# Patient Record
Sex: MALE | Race: WHITE | NOT HISPANIC OR LATINO | Employment: OTHER | URBAN - METROPOLITAN AREA
[De-identification: names, ages, dates, MRNs, and addresses within clinical notes are randomized per-mention and may not be internally consistent; named-entity substitution may affect disease eponyms.]

---

## 2016-08-12 LAB — HBA1C MFR BLD HPLC: 9.2 %

## 2017-03-03 LAB — HBA1C MFR BLD HPLC: 9.1 %

## 2017-10-26 LAB
CREAT ?TM UR-SCNC: 100.7 UMOL/L
HBA1C MFR BLD HPLC: 9.2 %
MICROALBUMIN/CREAT UR: NORMAL MG/G{CREAT}

## 2018-03-23 DIAGNOSIS — E11.8 TYPE 2 DIABETES MELLITUS WITH COMPLICATION, WITHOUT LONG-TERM CURRENT USE OF INSULIN (HCC): Primary | ICD-10-CM

## 2018-03-26 RX ORDER — GLIPIZIDE 5 MG/1
TABLET ORAL
Qty: 360 TABLET | Refills: 1 | Status: SHIPPED | OUTPATIENT
Start: 2018-03-26 | End: 2018-10-12 | Stop reason: SDUPTHER

## 2018-03-27 LAB — HBA1C MFR BLD HPLC: 9.2 %

## 2018-03-30 ENCOUNTER — OFFICE VISIT (OUTPATIENT)
Dept: ENDOCRINOLOGY | Facility: HOSPITAL | Age: 68
End: 2018-03-30
Payer: COMMERCIAL

## 2018-03-30 VITALS
WEIGHT: 245.8 LBS | HEART RATE: 86 BPM | HEIGHT: 71 IN | SYSTOLIC BLOOD PRESSURE: 160 MMHG | BODY MASS INDEX: 34.41 KG/M2 | DIASTOLIC BLOOD PRESSURE: 90 MMHG

## 2018-03-30 DIAGNOSIS — E11.40 TYPE 2 DIABETES MELLITUS WITH DIABETIC NEUROPATHY, WITHOUT LONG-TERM CURRENT USE OF INSULIN (HCC): ICD-10-CM

## 2018-03-30 DIAGNOSIS — I10 HYPERTENSION, UNSPECIFIED TYPE: ICD-10-CM

## 2018-03-30 DIAGNOSIS — E78.5 HYPERLIPIDEMIA, UNSPECIFIED HYPERLIPIDEMIA TYPE: Primary | ICD-10-CM

## 2018-03-30 PROCEDURE — 99204 OFFICE O/P NEW MOD 45 MIN: CPT | Performed by: INTERNAL MEDICINE

## 2018-03-30 RX ORDER — OMEPRAZOLE 20 MG/1
20 CAPSULE, DELAYED RELEASE ORAL DAILY
COMMUNITY

## 2018-03-30 RX ORDER — LISINOPRIL AND HYDROCHLOROTHIAZIDE 12.5; 1 MG/1; MG/1
1 TABLET ORAL DAILY
COMMUNITY

## 2018-03-30 RX ORDER — ASPIRIN 81 MG/1
81 TABLET, CHEWABLE ORAL DAILY
COMMUNITY

## 2018-03-30 RX ORDER — PNV NO.95/FERROUS FUM/FOLIC AC 28MG-0.8MG
1 TABLET ORAL DAILY
COMMUNITY
End: 2019-03-29 | Stop reason: SINTOL

## 2018-03-30 RX ORDER — MULTIVIT WITH MINERALS/LUTEIN
1000 TABLET ORAL DAILY
COMMUNITY

## 2018-03-30 NOTE — LETTER
March 30, 2018     [address] [place] [address] [facility]    Patient: Aubrie Trinh   YOB: 1950   Date of Visit: 3/30/2018       Dear Dr Astrid Owusu:    Thank you for referring Aubrie Trinh to me for evaluation  Below are my notes for this consultation  If you have questions, please do not hesitate to call me  I look forward to following your patient along with you  Sincerely,        Calos Powell MD        CC: No Recipients  Calos Powell MD  3/30/2018 12:22 PM  Sign at close encounter  3/30/2018    Assessment/Plan      Diagnoses and all orders for this visit:    Hyperlipidemia, unspecified hyperlipidemia type  -     Lipid Panel with Direct LDL reflex Lab Collect; Future    Type 2 diabetes mellitus with diabetic neuropathy, without long-term current use of insulin (HCC)  -     sitaGLIPtin (JANUVIA) 100 mg tablet; Take 1 tablet (100 mg total) by mouth daily  -     Lipid Panel with Direct LDL reflex Lab Collect; Future  -     Comprehensive metabolic panel Lab Collect; Future  -     HEMOGLOBIN A1C W/ EAG ESTIMATION Lab Collect; Future    Hypertension, unspecified type    Other orders  -     Canagliflozin (INVOKANA) 300 MG TABS; Take 300 mg by mouth daily before breakfast    -     metFORMIN (GLUCOPHAGE) 500 mg tablet; Take 500 mg by mouth 2 (two) times a day with meals  -     Loratadine-Pseudoephedrine (CLARITIN-D 12 HOUR PO); Take 1 tablet by mouth daily    -     lisinopril-hydrochlorothiazide (PRINZIDE,ZESTORETIC) 10-12 5 MG per tablet; Take 1 tablet by mouth daily  -     Multiple Vitamin (MULTI-VITAMIN DAILY PO); Take by mouth daily    -     Ascorbic Acid (VITAMIN C) 1000 MG tablet; Take 1,000 mg by mouth daily  -     aspirin (ASPIRIN 81) 81 mg chewable tablet; Chew 81 mg daily  -     omeprazole (PriLOSEC) 20 mg delayed release capsule; Take 20 mg by mouth daily  -     Ferrous Sulfate (IRON) 325 (65 Fe) MG TABS; Take 1 tablet by mouth daily        Assessment/Plan:  1    Type 2 diabetes  His most recent hemoglobin A1c is 9 2% which is way too high  It is not improved since last visit  He was informed his goal of blood sugar control is with a hemoglobin A1c of 7 0%  He is on a med at 3 medications at their maximum dosages, so our only option is to add either another oral agent or insulin  He says due to his lifestyle, insulin would not be an option  I will add Januvia 100 mg daily  He will continue his current dosages of metformin, Invokana, and glipizide  He will continue to check his blood sugars at least once a day  I talked to him about working on Lower carbohydrates in his diet  2   Hyperlipidemia  I will his lipid profile with his next blood work  3   Hypertension  His blood pressure was elevated when he 1st came to the office and decreased back to closer to his normal while on recheck  He will continue his current dose of lisinopril HCTZ  I have asked him to follow up in 3 months with preceding hemoglobin A1c, CMP, and lipid profile  CC: Diabetes Consult    History of Present Illness     HPI: Oren Holder is a 74y o  year old male with type 2 diabetes for 7 years  He was originally diagnosed with type 2 diabetes in 2011, but had difficulty getting his diabetes under control with only metformin  Eventually glipizide was added in 2015  He tried Actos for 2 months but went off it because of the bad press and it  had not helped his blood sugars  [address] was added January of 2016 and change to [facility] October of 2017 due to insurance issues  He is on oral agents at home and takes Invokana 300 mg daily, glipizide 5 mg 3 tablets in the morning and 1 tablet in the evening, metformin 1000 mg twice a day  He denies any polyphasia and blurry vision  He has polyuria with some urinary urgency and polydipsia  He feels his urinary urgency is worse on Invokana that when he was on Farxiga  He has once or twice a night nocturia  He denies chest pain   He has shortness of breath when he climbs steps rapidly  He has numbness and tingling of his feet which are slightly worse but not interfering with his activity  He denies nephropathy, retinopathy, heart attack, stroke and claudication but does admit to neuropathy  Has been eating more heavier foods with office meals recently within the last month or so  Some increase in stress with work  Hypoglycemic episodes: No never  H/o of hypoglycemia causing hospitalization or intervention such as glucagon injection  or ambulance call  No   Hypoglycemia symptoms: unknown since he never had one  Treatment of hypoglycemia:    Glucagon:No   Medic alert tag: recommended,Yes  The patient's last eye exam was in October 2017 with no retinopathy changes  The patient's last foot exam was in not recently seen  Last A1C was 9 2% on March 27, 2018  Blood Sugar/Glucometer/Pump/CGM review: checks blood sugars once a day in the am, no record brought with him today  Before breakfast: 150-190, primarily in the 170s  Before lunch:   Before dinner:   Bedtime:     Review of Systems   Constitutional: Negative for appetite change and fatigue  Weight decreased 4-5 lbs since 10/17   HENT: Positive for tinnitus  Negative for ear pain and hearing loss  Occasional tinnitus   Eyes: Negative for visual disturbance  Respiratory: Positive for shortness of breath  Negative for chest tightness  SOB with rapid climb stairs  Cardiovascular: Negative for chest pain, palpitations and leg swelling  Gastrointestinal: Positive for constipation  Negative for abdominal pain, diarrhea and nausea  Occasional constipation  Endocrine: Positive for polydipsia and polyuria  Negative for polyphagia  Feels more urgent with urination with invokana  Nocturia 1-2 times a night  Musculoskeletal: Positive for arthralgias  Negative for back pain  Some hand pain at dupuytren contracture at times  Skin: Negative for rash  Neurological: Positive for numbness  Negative for dizziness, tremors, light-headedness and headaches  Has numbness and tingling of the feet, slightly worse, does not interfere with activities  Gets pain on plantar surface of feet  Will get foot cramps at night at times  Psychiatric/Behavioral: Negative for sleep disturbance         Historical Information   Past Medical History:   Diagnosis Date    Dupuytren's contracture of hand     bilateral    GERD (gastroesophageal reflux disease)     Migraine     Osteoarthritis     Seasonal allergies      Past Surgical History:   Procedure Laterality Date    CATARACT EXTRACTION, BILATERAL Bilateral     INGUINAL HERNIA REPAIR      VASECTOMY       Social History   History   Alcohol Use No     History   Drug Use No     History   Smoking Status    Never Smoker   Smokeless Tobacco    Never Used     Family History:   Family History   Problem Relation Age of Onset    Hypertension Mother     Dementia Mother     Diabetes type II Mother     Lung cancer Father     Diabetes type II Father     Diabetes type II Sister     Arthritis Sister     Diabetes type II Brother     Diabetes unspecified Sister      prediabetes    Arthritis Sister     Hypertension Son     Obesity Son        Meds/Allergies   Current Outpatient Prescriptions   Medication Sig Dispense Refill    Ascorbic Acid (VITAMIN C) 1000 MG tablet Take 1,000 mg by mouth daily      aspirin (ASPIRIN 81) 81 mg chewable tablet Chew 81 mg daily      Canagliflozin (INVOKANA) 300 MG TABS Take 300 mg by mouth daily before breakfast        Ferrous Sulfate (IRON) 325 (65 Fe) MG TABS Take 1 tablet by mouth daily      glipiZIDE (GLUCOTROL) 5 mg tablet TAKE 3 TABLETS BY MOUTH  EVERY MORNING AND 1 TABLET  BY MOUTH EVERY EVENING 360 tablet 1    lisinopril-hydrochlorothiazide (PRINZIDE,ZESTORETIC) 10-12 5 MG per tablet Take 1 tablet by mouth daily      Loratadine-Pseudoephedrine (CLARITIN-D 12 HOUR PO) Take 1 tablet by mouth daily        metFORMIN (GLUCOPHAGE) 500 mg tablet Take 500 mg by mouth 2 (two) times a day with meals      Multiple Vitamin (MULTI-VITAMIN DAILY PO) Take by mouth daily        omeprazole (PriLOSEC) 20 mg delayed release capsule Take 20 mg by mouth daily      sitaGLIPtin (JANUVIA) 100 mg tablet Take 1 tablet (100 mg total) by mouth daily 90 tablet 3     No current facility-administered medications for this visit  No Known Allergies    Objective   Vitals: Blood pressure 160/90, pulse 86, height 5' 11" (1 803 m), weight 111 kg (245 lb 12 8 oz)  Invasive Devices          No matching active lines, drains, or airways          Physical Exam   Constitutional: He is oriented to person, place, and time  He appears well-developed and well-nourished  HENT:   Head: Normocephalic and atraumatic  Eyes: Conjunctivae and EOM are normal  Pupils are equal, round, and reactive to light  Neck: Normal range of motion  Neck supple  No thyromegaly present  No carotid bruits  Cardiovascular: Normal rate, regular rhythm, normal heart sounds and intact distal pulses  No murmur heard  BP recheck 138/74  Pulmonary/Chest: Effort normal and breath sounds normal  He has no wheezes  Abdominal: Soft  Bowel sounds are normal  There is no tenderness  Musculoskeletal: Normal range of motion  He exhibits no edema or deformity  No ulcerations of the feet  Bone spur on lateral heels bilaterally, right greater than left  No tremor of the outstretched hands, No CVAT  No spinous process tenderness  Lymphadenopathy:     He has no cervical adenopathy  Neurological: He is alert and oriented to person, place, and time  He has normal reflexes  Vibration sensation markedly diminished to the bilateral DIP  Skin: Skin is warm and dry  No rash noted  No erythema  Vitals reviewed  The history was obtained from the review of the chart and from the patient      Lab Results:   Blood work on March 27, 2018 showed hemoglobin A1c of 9 2%  CMP showed a glucose of 176 random  Blood work on October of 2017 showed hemoglobin A1c of 9 2%, TSH 1 28, urine microalbumin to creatinine ratio of less than 1 2 mg/dL  Total cholesterol was 224, triglycerides 191, HDL 50, and   No results found for this or any previous visit (from the past 37964 hour(s))        Future Appointments  Date Time Provider Raleigh Loving   6/28/2018 7:45 AM [facility]ISE Anderson [facility]

## 2018-03-30 NOTE — PROGRESS NOTES
3/30/2018    Assessment/Plan      Diagnoses and all orders for this visit:    Hyperlipidemia, unspecified hyperlipidemia type  -     Lipid Panel with Direct LDL reflex Lab Collect; Future    Type 2 diabetes mellitus with diabetic neuropathy, without long-term current use of insulin (HCC)  -     sitaGLIPtin (JANUVIA) 100 mg tablet; Take 1 tablet (100 mg total) by mouth daily  -     Lipid Panel with Direct LDL reflex Lab Collect; Future  -     Comprehensive metabolic panel Lab Collect; Future  -     HEMOGLOBIN A1C W/ EAG ESTIMATION Lab Collect; Future    Hypertension, unspecified type    Other orders  -     Canagliflozin (INVOKANA) 300 MG TABS; Take 300 mg by mouth daily before breakfast    -     metFORMIN (GLUCOPHAGE) 500 mg tablet; Take 500 mg by mouth 2 (two) times a day with meals  -     Loratadine-Pseudoephedrine (CLARITIN-D 12 HOUR PO); Take 1 tablet by mouth daily    -     lisinopril-hydrochlorothiazide (PRINZIDE,ZESTORETIC) 10-12 5 MG per tablet; Take 1 tablet by mouth daily  -     Multiple Vitamin (MULTI-VITAMIN DAILY PO); Take by mouth daily    -     Ascorbic Acid (VITAMIN C) 1000 MG tablet; Take 1,000 mg by mouth daily  -     aspirin (ASPIRIN 81) 81 mg chewable tablet; Chew 81 mg daily  -     omeprazole (PriLOSEC) 20 mg delayed release capsule; Take 20 mg by mouth daily  -     Ferrous Sulfate (IRON) 325 (65 Fe) MG TABS; Take 1 tablet by mouth daily        Assessment/Plan:  1  Type 2 diabetes  His most recent hemoglobin A1c is 9 2% which is way too high  It is not improved since last visit  He was informed his goal of blood sugar control is with a hemoglobin A1c of 7 0%  He is on a med at 3 medications at their maximum dosages, so our only option is to add either another oral agent or insulin  He says due to his lifestyle, insulin would not be an option  I will add Januvia 100 mg daily  He will continue his current dosages of metformin, Invokana, and glipizide    He will continue to check his blood sugars at least once a day  I talked to him about working on Lower carbohydrates in his diet  2   Hyperlipidemia  I will his lipid profile with his next blood work  3   Hypertension  His blood pressure was elevated when he 1st came to the office and decreased back to closer to his normal while on recheck  He will continue his current dose of lisinopril HCTZ  I have asked him to follow up in 3 months with preceding hemoglobin A1c, CMP, and lipid profile  CC: Diabetes Consult    History of Present Illness     HPI: Carina Hernandez is a 74y o  year old male with type 2 diabetes for 7 years  He was originally diagnosed with type 2 diabetes in 2011, but had difficulty getting his diabetes under control with only metformin  Eventually glipizide was added in 2015  He tried Actos for 2 months but went off it because of the bad press and it  had not helped his blood sugars  [address] was added January of 2016 and change to [facility] October of 2017 due to insurance issues  He is on oral agents at home and takes Invokana 300 mg daily, glipizide 5 mg 3 tablets in the morning and 1 tablet in the evening, metformin 1000 mg twice a day  He denies any polyphasia and blurry vision  He has polyuria with some urinary urgency and polydipsia  He feels his urinary urgency is worse on Invokana that when he was on Farxiga  He has once or twice a night nocturia  He denies chest pain  He has shortness of breath when he climbs steps rapidly  He has numbness and tingling of his feet which are slightly worse but not interfering with his activity  He denies nephropathy, retinopathy, heart attack, stroke and claudication but does admit to neuropathy  Has been eating more heavier foods with office meals recently within the last month or so  Some increase in stress with work  Hypoglycemic episodes: No never    H/o of hypoglycemia causing hospitalization or intervention such as glucagon injection  or ambulance call No   Hypoglycemia symptoms: unknown since he never had one  Treatment of hypoglycemia:    Glucagon:No   Medic alert tag: recommended,Yes  The patient's last eye exam was in October 2017 with no retinopathy changes  The patient's last foot exam was in not recently seen  Last A1C was 9 2% on March 27, 2018  Blood Sugar/Glucometer/Pump/CGM review: checks blood sugars once a day in the am, no record brought with him today  Before breakfast: 150-190, primarily in the 170s  Before lunch:   Before dinner:   Bedtime:     Review of Systems   Constitutional: Negative for appetite change and fatigue  Weight decreased 4-5 lbs since 10/17   HENT: Positive for tinnitus  Negative for ear pain and hearing loss  Occasional tinnitus   Eyes: Negative for visual disturbance  Respiratory: Positive for shortness of breath  Negative for chest tightness  SOB with rapid climb stairs  Cardiovascular: Negative for chest pain, palpitations and leg swelling  Gastrointestinal: Positive for constipation  Negative for abdominal pain, diarrhea and nausea  Occasional constipation  Endocrine: Positive for polydipsia and polyuria  Negative for polyphagia  Feels more urgent with urination with invokana  Nocturia 1-2 times a night  Musculoskeletal: Positive for arthralgias  Negative for back pain  Some hand pain at dupuytren contracture at times  Skin: Negative for rash  Neurological: Positive for numbness  Negative for dizziness, tremors, light-headedness and headaches  Has numbness and tingling of the feet, slightly worse, does not interfere with activities  Gets pain on plantar surface of feet  Will get foot cramps at night at times  Psychiatric/Behavioral: Negative for sleep disturbance         Historical Information   Past Medical History:   Diagnosis Date    Dupuytren's contracture of hand     bilateral    GERD (gastroesophageal reflux disease)     Migraine     Osteoarthritis     Seasonal allergies      Past Surgical History:   Procedure Laterality Date    CATARACT EXTRACTION, BILATERAL Bilateral     INGUINAL HERNIA REPAIR      VASECTOMY       Social History   History   Alcohol Use No     History   Drug Use No     History   Smoking Status    Never Smoker   Smokeless Tobacco    Never Used     Family History:   Family History   Problem Relation Age of Onset    Hypertension Mother     Dementia Mother     Diabetes type II Mother     Lung cancer Father     Diabetes type II Father     Diabetes type II Sister     Arthritis Sister     Diabetes type II Brother     Diabetes unspecified Sister      prediabetes    Arthritis Sister     Hypertension Son     Obesity Son        Meds/Allergies   Current Outpatient Prescriptions   Medication Sig Dispense Refill    Ascorbic Acid (VITAMIN C) 1000 MG tablet Take 1,000 mg by mouth daily      aspirin (ASPIRIN 81) 81 mg chewable tablet Chew 81 mg daily      Canagliflozin (INVOKANA) 300 MG TABS Take 300 mg by mouth daily before breakfast        Ferrous Sulfate (IRON) 325 (65 Fe) MG TABS Take 1 tablet by mouth daily      glipiZIDE (GLUCOTROL) 5 mg tablet TAKE 3 TABLETS BY MOUTH  EVERY MORNING AND 1 TABLET  BY MOUTH EVERY EVENING 360 tablet 1    lisinopril-hydrochlorothiazide (PRINZIDE,ZESTORETIC) 10-12 5 MG per tablet Take 1 tablet by mouth daily      Loratadine-Pseudoephedrine (CLARITIN-D 12 HOUR PO) Take 1 tablet by mouth daily        metFORMIN (GLUCOPHAGE) 500 mg tablet Take 500 mg by mouth 2 (two) times a day with meals      Multiple Vitamin (MULTI-VITAMIN DAILY PO) Take by mouth daily        omeprazole (PriLOSEC) 20 mg delayed release capsule Take 20 mg by mouth daily      sitaGLIPtin (JANUVIA) 100 mg tablet Take 1 tablet (100 mg total) by mouth daily 90 tablet 3     No current facility-administered medications for this visit        No Known Allergies    Objective   Vitals: Blood pressure 160/90, pulse 86, height 5' 11" (1 803 m), weight 111 kg (245 lb 12 8 oz)  Invasive Devices          No matching active lines, drains, or airways          Physical Exam   Constitutional: He is oriented to person, place, and time  He appears well-developed and well-nourished  HENT:   Head: Normocephalic and atraumatic  Eyes: Conjunctivae and EOM are normal  Pupils are equal, round, and reactive to light  Neck: Normal range of motion  Neck supple  No thyromegaly present  No carotid bruits  Cardiovascular: Normal rate, regular rhythm, normal heart sounds and intact distal pulses  No murmur heard  BP recheck 138/74  Pulmonary/Chest: Effort normal and breath sounds normal  He has no wheezes  Abdominal: Soft  Bowel sounds are normal  There is no tenderness  Musculoskeletal: Normal range of motion  He exhibits no edema or deformity  No ulcerations of the feet  Bone spur on lateral heels bilaterally, right greater than left  No tremor of the outstretched hands, No CVAT  No spinous process tenderness  Lymphadenopathy:     He has no cervical adenopathy  Neurological: He is alert and oriented to person, place, and time  He has normal reflexes  Vibration sensation markedly diminished to the bilateral DIP  Skin: Skin is warm and dry  No rash noted  No erythema  Vitals reviewed  The history was obtained from the review of the chart and from the patient  Lab Results:   Blood work on March 27, 2018 showed hemoglobin A1c of 9 2%  CMP showed a glucose of 176 random  Blood work on October of 2017 showed hemoglobin A1c of 9 2%, TSH 1 28, urine microalbumin to creatinine ratio of less than 1 2 mg/dL  Total cholesterol was 224, triglycerides 191, HDL 50, and   No results found for this or any previous visit (from the past 67401 hour(s))        Future Appointments  Date Time Provider Raleigh Loving   6/28/2018 7:45 AM [facility] Bruce [facility]

## 2018-03-30 NOTE — PATIENT INSTRUCTIONS
hba1c is 9 2%  This is way too high, the goal is around 7 0%  You are on the maximum of 3 medicines  Add Januvia 100 mg daily  Continue all other medicines the same  Continue to check blood sugars once daily  Work on better diet as able  Follow up in 3 months with blood work

## 2018-04-18 DIAGNOSIS — E11.8 TYPE 2 DIABETES MELLITUS WITH COMPLICATION, UNSPECIFIED WHETHER LONG TERM INSULIN USE: Primary | ICD-10-CM

## 2018-06-25 DIAGNOSIS — E11.40 TYPE 2 DIABETES MELLITUS WITH DIABETIC NEUROPATHY, WITHOUT LONG-TERM CURRENT USE OF INSULIN (HCC): ICD-10-CM

## 2018-06-26 LAB
ALBUMIN SERPL-MCNC: 4.2 G/DL (ref 3.6–4.8)
ALBUMIN/GLOB SERPL: 1.6 {RATIO} (ref 1.2–2.2)
ALP SERPL-CCNC: 57 IU/L (ref 39–117)
ALT SERPL-CCNC: 22 IU/L (ref 0–44)
AMBIG ABBREV DEFAULT: NORMAL
AST SERPL-CCNC: 15 IU/L (ref 0–40)
BILIRUB SERPL-MCNC: 0.3 MG/DL (ref 0–1.2)
BUN SERPL-MCNC: 18 MG/DL (ref 8–27)
BUN/CREAT SERPL: 18 (ref 10–24)
CALCIUM SERPL-MCNC: 9.4 MG/DL (ref 8.6–10.2)
CHLORIDE SERPL-SCNC: 101 MMOL/L (ref 96–106)
CHOLEST SERPL-MCNC: 207 MG/DL (ref 100–199)
CO2 SERPL-SCNC: 23 MMOL/L (ref 20–29)
CREAT SERPL-MCNC: 1.01 MG/DL (ref 0.76–1.27)
GLOBULIN SER-MCNC: 2.6 G/DL (ref 1.5–4.5)
GLUCOSE SERPL-MCNC: 189 MG/DL (ref 65–99)
HDLC SERPL-MCNC: 54 MG/DL
LDLC SERPL CALC-MCNC: 122 MG/DL (ref 0–99)
LDLC/HDLC SERPL: 2.3 RATIO (ref 0–3.6)
POTASSIUM SERPL-SCNC: 4.3 MMOL/L (ref 3.5–5.2)
PROT SERPL-MCNC: 6.8 G/DL (ref 6–8.5)
SL AMB EGFR AFRICAN AMERICAN: 90 ML/MIN/1.73
SL AMB EGFR NON AFRICAN AMERICAN: 78 ML/MIN/1.73
SL AMB VLDL CHOLESTEROL CALC: 31 MG/DL (ref 5–40)
SODIUM SERPL-SCNC: 141 MMOL/L (ref 134–144)
TRIGL SERPL-MCNC: 155 MG/DL (ref 0–149)

## 2018-06-28 ENCOUNTER — OFFICE VISIT (OUTPATIENT)
Dept: ENDOCRINOLOGY | Facility: HOSPITAL | Age: 68
End: 2018-06-28
Payer: COMMERCIAL

## 2018-06-28 VITALS
BODY MASS INDEX: 33.94 KG/M2 | HEART RATE: 86 BPM | HEIGHT: 71 IN | DIASTOLIC BLOOD PRESSURE: 84 MMHG | WEIGHT: 242.4 LBS | SYSTOLIC BLOOD PRESSURE: 144 MMHG

## 2018-06-28 DIAGNOSIS — I10 HYPERTENSION, UNSPECIFIED TYPE: ICD-10-CM

## 2018-06-28 DIAGNOSIS — E78.5 HYPERLIPIDEMIA, UNSPECIFIED HYPERLIPIDEMIA TYPE: ICD-10-CM

## 2018-06-28 DIAGNOSIS — E11.40 TYPE 2 DIABETES MELLITUS WITH DIABETIC NEUROPATHY, WITHOUT LONG-TERM CURRENT USE OF INSULIN (HCC): Primary | ICD-10-CM

## 2018-06-28 PROCEDURE — 99214 OFFICE O/P EST MOD 30 MIN: CPT | Performed by: NURSE PRACTITIONER

## 2018-06-28 NOTE — PATIENT INSTRUCTIONS
Be mindful of diet  Stay active in stay hydrated  For now, continue current regimen  Please check her blood sugars at least twice daily at alternating times and for the record to the office in 2 weeks for review and adjustment, if necessary  Obtain lab work as prescribed  Continue lisinopril HCTZ  Consider starting statin therapy for your cholesterol, as discussed

## 2018-06-28 NOTE — PROGRESS NOTES
Iam Rg 74 y o  male MRN: [id number]    Encounter: [id number]      Assessment/Plan     Assessment: This is a 74y o -year-old male with type 2 diabetes with hypertension and hyperlipidemia  Plan:  1  Type 2 diabetes:  He is checking his blood sugars once daily in the morning which are typically in the 130 range, by his report  The lab did not run his hemoglobin A1c as part of his lab work  For now, he will continue his current regimen  I have asked him to check his blood sugars twice daily at alternating times and send a record to the office in 2 weeks for review and adjustment, to gain more insight into his glycemic control throughout the day  I talked to him about working on Lower carbohydrates in his diet  Offered medical nutrition therapy for help with his diet which was declined at this time  Check hemoglobin A1c   2   Hyperlipidemia:  His LDL, triglycerides and total cholesterol are slightly elevated  We discussed the benefits, risks and side effects of starting statin therapy  He would like to wait and reassess at future visits  3   Hypertension  His blood pressure was elevated initially in the office today but was reassessed to 130/72  He will continue his current dose of lisinopril HCTZ  CC:  Type 2 Diabetes follow-up    History of Present Illness     HPI:  74y o  year old male with type 2 diabetes for 7 years  He was originally diagnosed with type 2 diabetes in 2011, but had difficulty getting his diabetes under control with only metformin  Eventually glipizide was added in 2015  Sudarshan Broderick was added January of 2016 and change to [facility] October of 2017 due to insurance issues  He is on oral agents at home and takes Invokana 300 mg daily, glipizide 5 mg 3 tablets in the morning and 1 tablet in the evening, metformin 1000 mg twice a day  He checks his blood sugars once daily in the AM and states his blood sugar is typically around 130   He denies any polyphasia and blurry vision  He has polyuria with some urinary urgency which she attributes to Invokana and HCTZ  He denies any recent episodes of hypoglycemia  He has once or twice a night nocturia  He denies chest pain  He has shortness of breath when he climbs steps rapidly  He has numbness and tingling of his feet which are slightly worse but not interfering with his activity  He denies nephropathy, retinopathy, heart attack, stroke and claudication but does admit to neuropathy  He also notes some increase in stress with work      The patient's last eye exam was in October 2017 with Dr Dionna Bhakta with no retinopathy changes  The patient's last foot exam was in not recently seen       For his hypertension, he is treated with lisinopril HCTZ 10-12 5 mg daily  His most recent fasting lipid panel from June 26, 2018 reveals a total cholesterol of 207, triglycerides of 155, HDL of 54 and LDL of 122  He is not currently treated with any medication for his cholesterol  Review of Systems   Constitutional: Negative  Negative for chills, fatigue and fever  HENT: Negative  Eyes: Negative  Respiratory: Negative for cough and shortness of breath  Cardiovascular: Negative for chest pain and palpitations  Gastrointestinal: Negative for abdominal pain, constipation, diarrhea, nausea and vomiting  Endocrine: Positive for polydipsia and polyuria  Negative for cold intolerance, heat intolerance and polyphagia  Genitourinary: Negative  Musculoskeletal: Positive for arthralgias ( neck and hands) and myalgias (Neck and hands)  Skin: Negative  Allergic/Immunologic: Negative  Neurological: Negative for dizziness, syncope, light-headedness and headaches  Hematological: Negative  Psychiatric/Behavioral: Negative  All other systems reviewed and are negative        Historical Information   Past Medical History:   Diagnosis Date    Dupuytren's contracture of hand     bilateral    GERD (gastroesophageal reflux disease)     Migraine     Osteoarthritis     Seasonal allergies      Past Surgical History:   Procedure Laterality Date    CATARACT EXTRACTION, BILATERAL Bilateral     INGUINAL HERNIA REPAIR      VASECTOMY       Social History   History   Alcohol Use No     History   Drug Use No     History   Smoking Status    Never Smoker   Smokeless Tobacco    Never Used     Family History:   Family History   Problem Relation Age of Onset    Hypertension Mother     Dementia Mother     Diabetes type II Mother     Lung cancer Father     Diabetes type II Father     Diabetes type II Sister     Arthritis Sister     Diabetes type II Brother     Diabetes unspecified Sister         prediabetes    Arthritis Sister     Hypertension Son     Obesity Son        Meds/Allergies   Current Outpatient Prescriptions   Medication Sig Dispense Refill    Ascorbic Acid (VITAMIN C) 1000 MG tablet Take 1,000 mg by mouth daily      aspirin (ASPIRIN 81) 81 mg chewable tablet Chew 81 mg daily      Canagliflozin (INVOKANA) 300 MG TABS Take 1 tablet (300 mg total) by mouth daily before breakfast 90 tablet 0    Ferrous Sulfate (IRON) 325 (65 Fe) MG TABS Take 1 tablet by mouth daily      glipiZIDE (GLUCOTROL) 5 mg tablet TAKE 3 TABLETS BY MOUTH  EVERY MORNING AND 1 TABLET  BY MOUTH EVERY EVENING 360 tablet 1    lisinopril-hydrochlorothiazide (PRINZIDE,ZESTORETIC) 10-12 5 MG per tablet Take 1 tablet by mouth daily      Loratadine-Pseudoephedrine (CLARITIN-D 12 HOUR PO) Take 1 tablet by mouth daily        metFORMIN (GLUCOPHAGE) 500 mg tablet Take 1,000 mg by mouth 2 (two) times a day with meals        Multiple Vitamin (MULTI-VITAMIN DAILY PO) Take by mouth daily        omeprazole (PriLOSEC) 20 mg delayed release capsule Take 20 mg by mouth daily      sitaGLIPtin (JANUVIA) 100 mg tablet Take 1 tablet (100 mg total) by mouth daily 90 tablet 0     No current facility-administered medications for this visit        No Known Allergies    Objective   Vitals: Blood pressure 144/84, pulse 86, height 5' 11" (1 803 m), weight 110 kg (242 lb 6 4 oz)  Physical Exam   Constitutional: He is oriented to person, place, and time  He appears well-developed and well-nourished  Overweight   HENT:   Head: Normocephalic and atraumatic  Nose: Nose normal    Mouth/Throat: Oropharynx is clear and moist    Eyes: Conjunctivae and EOM are normal  Pupils are equal, round, and reactive to light  Right eye exhibits no discharge  Left eye exhibits no discharge  Wears glasses   Neck: Normal range of motion  Neck supple  No JVD present  No tracheal deviation present  No thyromegaly present  Cardiovascular: Normal rate, regular rhythm, normal heart sounds and intact distal pulses  Pulmonary/Chest: Effort normal and breath sounds normal  No stridor  No respiratory distress  He has no wheezes  He has no rales  He exhibits no tenderness  Abdominal: Soft  Bowel sounds are normal  He exhibits no distension  There is no tenderness  Musculoskeletal: Normal range of motion  He exhibits deformity (Dupertyns contracture to hands bilaterally)  He exhibits no edema or tenderness  Lymphadenopathy:     He has no cervical adenopathy  Neurological: He is alert and oriented to person, place, and time  He displays normal reflexes  No cranial nerve deficit  Coordination normal    Skin: Skin is warm and dry  No rash noted  No erythema  No pallor  Psychiatric: He has a normal mood and affect  His behavior is normal  Judgment and thought content normal    Vitals reviewed  Lab Results:   Lab Results   Component Value Date/Time    BUN 18 06/25/2018 08:35 AM    Creatinine, Serum 1 01 06/25/2018 08:35 AM    SL AMB LDL-CHOLESTEROL 122 (H) 06/25/2018 08:35 AM    Triglycerides 155 (H) 06/25/2018 08:35 AM     Portions of the record may have been created with voice recognition software   Occasional wrong word or "sound a like" substitutions may have occurred due to the inherent limitations of voice recognition software  Read the chart carefully and recognize, using context, where substitutions have occurred

## 2018-07-09 DIAGNOSIS — E11.8 TYPE 2 DIABETES MELLITUS WITH COMPLICATION, UNSPECIFIED WHETHER LONG TERM INSULIN USE: ICD-10-CM

## 2018-09-14 DIAGNOSIS — E11.8 TYPE 2 DIABETES MELLITUS WITH COMPLICATION, UNSPECIFIED WHETHER LONG TERM INSULIN USE: Primary | ICD-10-CM

## 2018-09-25 DIAGNOSIS — E11.40 TYPE 2 DIABETES MELLITUS WITH DIABETIC NEUROPATHY, WITHOUT LONG-TERM CURRENT USE OF INSULIN (HCC): ICD-10-CM

## 2018-10-08 ENCOUNTER — TELEPHONE (OUTPATIENT)
Dept: ENDOCRINOLOGY | Facility: HOSPITAL | Age: 68
End: 2018-10-08

## 2018-10-08 DIAGNOSIS — E11.40 TYPE 2 DIABETES MELLITUS WITH DIABETIC NEUROPATHY, WITHOUT LONG-TERM CURRENT USE OF INSULIN (HCC): Primary | ICD-10-CM

## 2018-10-08 NOTE — TELEPHONE ENCOUNTER
Patient has an appointment on Friday with Alex Molina and needs a new lab slip  What would you like ordered?

## 2018-10-08 NOTE — TELEPHONE ENCOUNTER
Called and spoke to patient/ he requested labs be faxed to [facility] [place]   Faxed

## 2018-10-10 LAB
ALBUMIN SERPL-MCNC: 4.2 G/DL (ref 3.6–4.8)
ALBUMIN/CREAT UR: <2.8 MG/G CREAT (ref 0–30)
ALBUMIN/GLOB SERPL: 1.7 {RATIO} (ref 1.2–2.2)
ALP SERPL-CCNC: 59 IU/L (ref 39–117)
ALT SERPL-CCNC: 20 IU/L (ref 0–44)
AST SERPL-CCNC: 20 IU/L (ref 0–40)
BILIRUB SERPL-MCNC: 0.3 MG/DL (ref 0–1.2)
BUN SERPL-MCNC: 18 MG/DL (ref 8–27)
BUN/CREAT SERPL: 19 (ref 10–24)
CALCIUM SERPL-MCNC: 9.2 MG/DL (ref 8.6–10.2)
CHLORIDE SERPL-SCNC: 100 MMOL/L (ref 96–106)
CO2 SERPL-SCNC: 23 MMOL/L (ref 20–29)
CREAT SERPL-MCNC: 0.93 MG/DL (ref 0.76–1.27)
CREAT UR-MCNC: 107.3 MG/DL
EST. AVERAGE GLUCOSE BLD GHB EST-MCNC: 186 MG/DL
GLOBULIN SER-MCNC: 2.5 G/DL (ref 1.5–4.5)
GLUCOSE SERPL-MCNC: 121 MG/DL (ref 65–99)
HBA1C MFR BLD: 8.1 % (ref 4.8–5.6)
LABCORP COMMENT: NORMAL
MICROALBUMIN UR-MCNC: <3 UG/ML
POTASSIUM SERPL-SCNC: 4.2 MMOL/L (ref 3.5–5.2)
PROT SERPL-MCNC: 6.7 G/DL (ref 6–8.5)
SL AMB EGFR AFRICAN AMERICAN: 99 ML/MIN/1.73
SL AMB EGFR NON AFRICAN AMERICAN: 85 ML/MIN/1.73
SODIUM SERPL-SCNC: 140 MMOL/L (ref 134–144)
TSH SERPL DL<=0.005 MIU/L-ACNC: 1.5 UIU/ML (ref 0.45–4.5)

## 2018-10-12 ENCOUNTER — OFFICE VISIT (OUTPATIENT)
Dept: ENDOCRINOLOGY | Facility: HOSPITAL | Age: 68
End: 2018-10-12
Payer: COMMERCIAL

## 2018-10-12 VITALS
BODY MASS INDEX: 33.29 KG/M2 | WEIGHT: 237.8 LBS | SYSTOLIC BLOOD PRESSURE: 130 MMHG | HEIGHT: 71 IN | HEART RATE: 86 BPM | DIASTOLIC BLOOD PRESSURE: 78 MMHG

## 2018-10-12 DIAGNOSIS — E78.5 HYPERLIPIDEMIA, UNSPECIFIED HYPERLIPIDEMIA TYPE: ICD-10-CM

## 2018-10-12 DIAGNOSIS — I10 HYPERTENSION, UNSPECIFIED TYPE: ICD-10-CM

## 2018-10-12 DIAGNOSIS — E11.40 TYPE 2 DIABETES MELLITUS WITH DIABETIC NEUROPATHY, WITHOUT LONG-TERM CURRENT USE OF INSULIN (HCC): Primary | ICD-10-CM

## 2018-10-12 DIAGNOSIS — E11.8 TYPE 2 DIABETES MELLITUS WITH COMPLICATION, WITHOUT LONG-TERM CURRENT USE OF INSULIN (HCC): Primary | ICD-10-CM

## 2018-10-12 PROCEDURE — 99214 OFFICE O/P EST MOD 30 MIN: CPT | Performed by: NURSE PRACTITIONER

## 2018-10-12 RX ORDER — GLIPIZIDE 5 MG/1
TABLET ORAL
Qty: 360 TABLET | Refills: 1 | Status: SHIPPED | OUTPATIENT
Start: 2018-10-12 | End: 2019-01-07 | Stop reason: SDUPTHER

## 2018-10-12 NOTE — PROGRESS NOTES
Holland Erickson 79 y o  male MRN: [id number]    Encounter: [id number]      Assessment/Plan     Assessment: This is a 79y o -year-old male with  type 2 diabetes with hypertension and hyperlipidemia  Plan:  1   Type 2 diabetes:   His most recent hemoglobin A1c is 8 1  His morning fasting blood sugars are fairly reasonable but my suspicion is that his blood sugars are rising throughout the day  I have asked him to check his blood sugars twice daily at alternating times and send a record to the office in 2 weeks for review and adjustment, to gain more insight into his glycemic control throughout the day  Discussed starting long-acting insulin therapy with Levemir or Lantus which was declined at this time  He would like to reassess at next visit after he retires at the end of December 2018  He believes that his diet and sedentary lifestyle for work will improve after he retires  For now, he will continue current regimen  diabetic foot exam performed at the time of the visit today with good monofilament sensation  Check hemoglobin A1c   2   Hyperlipidemia:  His LDL, triglycerides and total cholesterol are slightly elevated  We discussed the benefits, risks and side effects of starting statin therapy  He would like to wait and also reassess at the future visit  3  Estelita Cast is normotensive in the office today  Kehinde Alvarez will continue his current dose of lisinopril HCTZ  CC:   Type 2 Diabetes follow-up    History of Present Illness     HPI:  74 y  o  year old male with type 2 diabetes for 7 years  Kehinde Alvarez was originally diagnosed with type 2 diabetes in 2011, but had difficulty getting his diabetes under control with only metformin   Eventually glipizide was added in 2015   Farxiga was added January of 2016 and change to [facility] October of 2017 due to insurance issues   He is on oral agents at home and takes Invokana 300 mg daily, glipizide 5 mg -3 tablets in the morning and 1 tablet in the evening, metformin 1000 mg twice a day  His most recent hemoglobin A1c from October 9, 2018 is 8 1  He checks his blood sugars once daily in the AM and states his blood sugar is typically around 130  He denies any polyphasia and blurry vision  He has polyuria with some urinary urgency which she attributes to Invokana and HCTZ  He denies any recent episodes of hypoglycemia  He has once or twice a night nocturia   He denies chest pain  He has shortness of breath when he climbs steps rapidly  Tiffanie Arias has numbness and tingling of his feet which are slightly worse but not interfering with his activity   He denies nephropathy, retinopathy, heart attack, stroke and claudication but does admit to neuropathy  He also notes some increase in stress with work      The patient's last eye exam was in October 2017 with Dr Callum Call with no retinopathy changes  Next annual appointment is next week  Sweetie Pepe patient's last foot exam was in not recently seen       For his hypertension, he is treated with lisinopril HCTZ 10-12 5 mg daily      His most recent fasting lipid panel from June 26, 2018 reveals a total cholesterol of 207, triglycerides of 155, HDL of 54 and LDL of 122  He is not currently treated with any medication for his cholesterol  Review of Systems   Constitutional: Negative  Negative for fatigue  HENT: Negative  Respiratory: Negative for cough and shortness of breath  Cardiovascular: Negative for chest pain and palpitations  Gastrointestinal: Negative for abdominal pain, constipation, diarrhea, nausea and vomiting  Endocrine: Negative for polydipsia and polyuria  Genitourinary: Negative  Musculoskeletal: Positive for arthralgias (Neck and hands)  Skin: Negative  Allergic/Immunologic: Negative  Neurological: Negative for syncope, light-headedness and headaches  Hematological: Negative  Psychiatric/Behavioral: Negative  All other systems reviewed and are negative        Historical Information   Past Medical History:   Diagnosis Date    Dupuytren's contracture of hand     bilateral    GERD (gastroesophageal reflux disease)     Migraine     Osteoarthritis     Seasonal allergies      Past Surgical History:   Procedure Laterality Date    CATARACT EXTRACTION, BILATERAL Bilateral     INGUINAL HERNIA REPAIR      VASECTOMY       Social History   History   Alcohol Use No     History   Drug Use No     History   Smoking Status    Never Smoker   Smokeless Tobacco    Never Used     Family History:   Family History   Problem Relation Age of Onset    Hypertension Mother     Dementia Mother     Diabetes type II Mother     Lung cancer Father     Diabetes type II Father     Diabetes type II Sister     Arthritis Sister     Diabetes type II Brother     Diabetes unspecified Sister         prediabetes    Arthritis Sister     Hypertension Son     Obesity Son        Meds/Allergies   Current Outpatient Prescriptions   Medication Sig Dispense Refill    Ascorbic Acid (VITAMIN C) 1000 MG tablet Take 1,000 mg by mouth daily      aspirin (ASPIRIN 81) 81 mg chewable tablet Chew 81 mg daily      Canagliflozin (INVOKANA) 300 MG TABS Take 1 tablet (300 mg total) by mouth daily before breakfast 90 tablet 1    Ferrous Sulfate (IRON) 325 (65 Fe) MG TABS Take 1 tablet by mouth daily      glipiZIDE (GLUCOTROL) 5 mg tablet TAKE 3 TABLETS BY MOUTH  EVERY MORNING AND 1 TABLET  BY MOUTH EVERY EVENING 360 tablet 1    glucose blood (ONE TOUCH ULTRA TEST) test strip Check blood sugars twice daily 100 each 3    lisinopril-hydrochlorothiazide (PRINZIDE,ZESTORETIC) 10-12 5 MG per tablet Take 1 tablet by mouth daily      Loratadine-Pseudoephedrine (CLARITIN-D 12 HOUR PO) Take 1 tablet by mouth daily        metFORMIN (GLUCOPHAGE) 500 mg tablet Take 1,000 mg by mouth 2 (two) times a day with meals        Multiple Vitamin (MULTI-VITAMIN DAILY PO) Take by mouth daily        omeprazole (PriLOSEC) 20 mg delayed release capsule Take 20 mg by mouth daily      ONETOUCH DELICA LANCETS FINE MISC Check glucose twice daily 100 each 3    sitaGLIPtin (JANUVIA) 100 mg tablet Take 1 tablet (100 mg total) by mouth daily 90 tablet 0     No current facility-administered medications for this visit  No Known Allergies    Objective   Vitals: Blood pressure 130/78, pulse 86, height 5' 11" (1 803 m), weight 108 kg (237 lb 12 8 oz)  Physical Exam   Constitutional: He is oriented to person, place, and time  He appears well-developed and well-nourished  No distress  Overweight   HENT:   Head: Normocephalic and atraumatic  Nose: Nose normal    Mouth/Throat: Oropharynx is clear and moist    Eyes: Pupils are equal, round, and reactive to light  Conjunctivae and EOM are normal  Right eye exhibits no discharge  Left eye exhibits no discharge  No scleral icterus  Neck: Normal range of motion  Neck supple  No JVD present  No tracheal deviation present  No thyromegaly present  Cardiovascular: Normal rate, regular rhythm, normal heart sounds and intact distal pulses  Exam reveals no gallop and no friction rub  Pulses are no weak pulses  No murmur heard  Pulses:       Dorsalis pedis pulses are 1+ on the right side, and 1+ on the left side  Pulmonary/Chest: Effort normal and breath sounds normal  No stridor  No respiratory distress  He has no wheezes  He has no rales  He exhibits no tenderness  Abdominal: Soft  Bowel sounds are normal  He exhibits no distension  There is no tenderness  Musculoskeletal: Normal range of motion  He exhibits deformity (Dueputens contracture to both hands)  He exhibits no edema or tenderness  Feet:   Right Foot:   Skin Integrity: Negative for ulcer, skin breakdown, erythema, warmth, callus or dry skin  Left Foot:   Skin Integrity: Negative for ulcer, skin breakdown, erythema, warmth, callus or dry skin  Lymphadenopathy:     He has no cervical adenopathy     Neurological: He is alert and oriented to person, place, and time  He displays normal reflexes  No cranial nerve deficit  Coordination normal    Skin: Skin is warm and dry  No rash noted  No erythema  No pallor  Psychiatric: He has a normal mood and affect  His behavior is normal  Judgment and thought content normal    Vitals reviewed  Patient's shoes and socks removed  Right Foot/Ankle   Right Foot Inspection  Skin Exam: skin normal and skin intact no dry skin, no warmth, no callus, no erythema, no maceration, no abnormal color, no pre-ulcer, no ulcer and no callus                          Toe Exam: ROM and strength within normal limits  Sensory       Monofilament testing: intact  Vascular  Capillary refills: < 3 seconds  The right DP pulse is 1+  Left Foot/Ankle  Left Foot Inspection  Skin Exam: skin normal and skin intactno dry skin, no warmth, no erythema, no maceration, normal color, no pre-ulcer, no ulcer and no callus                         Toe Exam: ROM and strength within normal limits                   Sensory       Monofilament: intact  Vascular  Capillary refills: < 3 seconds  The left DP pulse is 1+  Assign Risk Category:  No deformity present; No loss of protective sensation; No weak pulses       Risk: 0        Lab Results:   Lab Results   Component Value Date/Time    Hemoglobin A1C 8 1 (H) 10/09/2018 08:45 AM    Hemoglobin A1C 9 2 03/27/2018    Hemoglobin A1C 9 2 10/26/2017    BUN 18 10/09/2018 08:47 AM    BUN 18 06/25/2018 08:35 AM    Chloride 100 10/09/2018 08:47 AM    Chloride 101 06/25/2018 08:35 AM    CO2 23 10/09/2018 08:47 AM    CO2 23 06/25/2018 08:35 AM    Creatinine 0 93 10/09/2018 08:47 AM    Creatinine 1 01 06/25/2018 08:35 AM    Albumin 4 2 10/09/2018 08:47 AM    Albumin 4 2 06/25/2018 08:35 AM    HDL 54 06/25/2018 08:35 AM    LDL Direct 122 (H) 06/25/2018 08:35 AM    Triglycerides 155 (H) 06/25/2018 08:35 AM     Portions of the record may have been created with voice recognition software   Occasional wrong word or "sound a like" substitutions may have occurred due to the inherent limitations of voice recognition software  Read the chart carefully and recognize, using context, where substitutions have occurred

## 2018-10-12 NOTE — PATIENT INSTRUCTIONS
Be mindful of diet  Stay active in stay hydrated  For now, continue current regimen  Consider starting long acting insulin in future  Please check her blood sugars at least twice daily at alternating times and for the record to the office in 2 weeks for review and adjustment, if   necessary  Obtain lab work as prescribed  Continue lisinopril HCTZ  Consider starting statin therapy for your cholesterol, as discussed

## 2018-10-16 DIAGNOSIS — E11.8 TYPE 2 DIABETES MELLITUS WITH COMPLICATION, WITHOUT LONG-TERM CURRENT USE OF INSULIN (HCC): Primary | ICD-10-CM

## 2018-10-16 NOTE — TELEPHONE ENCOUNTER
Spoke with patient, he states he was previously on Metformin 1000mg, twice daily  We just ordered Metformin 500mg, 2 tablets twice daily  He would like to know if we can order the 1000mg tablets  Please advise  Patient also states [facility] had an issue with the recent Metformin order, we will need to reach out to them- which I will do

## 2018-12-21 DIAGNOSIS — E11.40 TYPE 2 DIABETES MELLITUS WITH DIABETIC NEUROPATHY, WITHOUT LONG-TERM CURRENT USE OF INSULIN (HCC): ICD-10-CM

## 2018-12-26 RX ORDER — SITAGLIPTIN 100 MG/1
TABLET, FILM COATED ORAL
Qty: 90 TABLET | Refills: 0 | Status: SHIPPED | OUTPATIENT
Start: 2018-12-26 | End: 2019-03-29 | Stop reason: SDUPTHER

## 2019-01-07 DIAGNOSIS — E11.8 TYPE 2 DIABETES MELLITUS WITH COMPLICATION, WITHOUT LONG-TERM CURRENT USE OF INSULIN (HCC): ICD-10-CM

## 2019-01-07 RX ORDER — GLIPIZIDE 5 MG/1
TABLET ORAL
Qty: 360 TABLET | Refills: 1 | Status: SHIPPED | OUTPATIENT
Start: 2019-01-07 | End: 2019-04-11 | Stop reason: SDUPTHER

## 2019-01-25 DIAGNOSIS — E11.8 TYPE 2 DIABETES MELLITUS WITH COMPLICATION, UNSPECIFIED WHETHER LONG TERM INSULIN USE: ICD-10-CM

## 2019-01-28 ENCOUNTER — TELEPHONE (OUTPATIENT)
Dept: ENDOCRINOLOGY | Facility: HOSPITAL | Age: 69
End: 2019-01-28

## 2019-01-28 NOTE — TELEPHONE ENCOUNTER
Patient called, his Abdirashid Huntera is no longer covered since he switched to Medicare and is very expensive  He would like to know if he could get samples every month since he cannot use a coupon card

## 2019-01-29 NOTE — TELEPHONE ENCOUNTER
Spoke to patient's wife, she is going to call [employer] and find out what is covered   She thinks that he may have enough left to last until his appointment

## 2019-02-08 LAB — HBA1C MFR BLD HPLC: 7.9 %

## 2019-02-14 DIAGNOSIS — E11.8 TYPE 2 DIABETES MELLITUS WITH COMPLICATION, UNSPECIFIED WHETHER LONG TERM INSULIN USE: ICD-10-CM

## 2019-02-20 DIAGNOSIS — E11.8 TYPE 2 DIABETES MELLITUS WITH COMPLICATION, UNSPECIFIED WHETHER LONG TERM INSULIN USE: ICD-10-CM

## 2019-03-29 ENCOUNTER — OFFICE VISIT (OUTPATIENT)
Dept: ENDOCRINOLOGY | Facility: HOSPITAL | Age: 69
End: 2019-03-29
Payer: MEDICARE

## 2019-03-29 VITALS
WEIGHT: 235.4 LBS | BODY MASS INDEX: 32.96 KG/M2 | HEART RATE: 74 BPM | HEIGHT: 71 IN | DIASTOLIC BLOOD PRESSURE: 84 MMHG | SYSTOLIC BLOOD PRESSURE: 142 MMHG

## 2019-03-29 DIAGNOSIS — E11.8 TYPE 2 DIABETES MELLITUS WITH COMPLICATION, WITHOUT LONG-TERM CURRENT USE OF INSULIN (HCC): ICD-10-CM

## 2019-03-29 DIAGNOSIS — E78.2 MIXED HYPERLIPIDEMIA: ICD-10-CM

## 2019-03-29 DIAGNOSIS — E11.40 TYPE 2 DIABETES MELLITUS WITH DIABETIC NEUROPATHY, WITHOUT LONG-TERM CURRENT USE OF INSULIN (HCC): Primary | ICD-10-CM

## 2019-03-29 DIAGNOSIS — I10 ESSENTIAL HYPERTENSION: ICD-10-CM

## 2019-03-29 PROCEDURE — 99215 OFFICE O/P EST HI 40 MIN: CPT | Performed by: INTERNAL MEDICINE

## 2019-03-29 RX ORDER — ATORVASTATIN CALCIUM 10 MG/1
10 TABLET, FILM COATED ORAL DAILY
Qty: 90 TABLET | Refills: 6 | Status: SHIPPED | OUTPATIENT
Start: 2019-03-29 | End: 2020-06-18

## 2019-03-29 NOTE — PATIENT INSTRUCTIONS
hgba1c is 7 9%  This is improved  Move the metformin and glipizide in the evening to suppertime  No change in or diabetes meds for now  The thyroid, liver, kidney, blood count are normal   Cholesterol is high  I would recommend cholesterol treatment  Let's try atorvastatin/lipitor 10 mg daily  Continue to check blood sugar daily, try to get some checked in the evening  Follow up in 3 months with blood work

## 2019-03-29 NOTE — PROGRESS NOTES
3/29/2019    Assessment/Plan      Diagnoses and all orders for this visit:    Type 2 diabetes mellitus with diabetic neuropathy, without long-term current use of insulin (HCC)  -     sitaGLIPtin (JANUVIA) 100 mg tablet; Take 1 tablet (100 mg total) by mouth daily  -     HEMOGLOBIN A1C W/ EAG ESTIMATION Lab Collect; Future  -     Comprehensive metabolic panel Lab Collect; Future  -     Lipid Panel with Direct LDL reflex Lab Collect; Future    Essential hypertension  -     HEMOGLOBIN A1C W/ EAG ESTIMATION Lab Collect; Future  -     Comprehensive metabolic panel Lab Collect; Future  -     Lipid Panel with Direct LDL reflex Lab Collect; Future    Mixed hyperlipidemia  -     atorvastatin (LIPITOR) 10 mg tablet; Take 1 tablet (10 mg total) by mouth daily  -     HEMOGLOBIN A1C W/ EAG ESTIMATION Lab Collect; Future  -     Comprehensive metabolic panel Lab Collect; Future  -     Lipid Panel with Direct LDL reflex Lab Collect; Future    Type 2 diabetes mellitus with complication, without long-term current use of insulin (HCC)  -     metFORMIN (GLUCOPHAGE) 1000 MG tablet; Take 1 tablet (1,000 mg total) by mouth 2 (two) times a day with meals  -     HEMOGLOBIN A1C W/ EAG ESTIMATION Lab Collect; Future  -     Comprehensive metabolic panel Lab Collect; Future  -     Lipid Panel with Direct LDL reflex Lab Collect; Future        Assessment/Plan:  1  Type 2 diabetes  His most recent hemoglobin A1c has improved slightly to 7 9%  It is not yet at goal as I would like it in the low sevens  He has not been taking his evening glipizide or metformin at the proper time  I will have him take the mid evening glipizide and metformin at supper instead of at bedtime to see if that improves his blood sugars  He is currently on the maximum dose of Invokana, glipizide, Januvia, and metformin  He is not 1 insulin therapy at this point  I will not adjust the dosages of medicines, just the timing at this point    He will continue to check his blood sugars at least once a day although I have asked him to try to check some blood sugars later in the day  2  Diabetic neuropathy  His neuropathy symptoms are slightly worse but stable  This will be followed over time  3  Hypertension  Blood pressure is under fair control on his current dose of lisinopril/HCTZ 10/12 5 mg daily  4  Hyperlipidemia  His lipid profile does show an elevated total cholesterol and LDL cholesterol in given his diabetes status, he should be on a statin  I have started atorvastatin 10 mg daily  I have asked him to follow up in 3 months with preceding hemoglobin A1c, CMP, and lipid panel  CC: Diabetes type 2, lipid, and blood pressure follow-up    History of Present Illness     HPI: Marcelino Bynum is a 79y o  year old male with type 2 diabetes for 8 years  He is on oral agents at home and takes Januvia 100 mg daily, metformin 1000 mg twice a day, Invokana 300 mg daily, glipizide 5 mg 3 tablets in the morning and 1 tablet in the evening  He has been taking metformin and glipizide at bedtime  He denies any polyuria, polyphagia,  and blurry vision  He has polydipsia and occasional once a night nocturia  He has chronic numbness and tingling of his feet which is slightly worse  He denies chest pain or shortness of breath  He denies nephropathy, retinopathy, heart attack, stroke and claudication but does admit to neuropathy  Stress with wife in hospital last week with 5th bout of pancreatitis this year  He has hypertension and takes lisinopril/HCTZ 10/12 5 mg daily  He denies headache or stroke-like symptoms  Has hyperlipidemia but is on no current medications  He denies chest pain or shortness of breath  Hypoglycemic episodes: No never  The patient's last eye exam was in October 2018 with no retinopathy  The patient's last foot exam was in October 2018 at last Endocrinology office visit   Last A1C was   Lab Results   Component Value Date    HGBA1C 7 9 02/08/2019      Blood Sugar/Glucometer/Pump/CGM review: checks blood sugars once daily in am   Before breakfast: 100-180s  Before lunch:   Before dinner:   Bedtime:     Review of Systems   Constitutional: Negative for fatigue and unexpected weight change  HENT: Negative for trouble swallowing  Eyes: Negative for visual disturbance  Wears glasses  Respiratory: Negative for chest tightness and shortness of breath  Cardiovascular: Negative for chest pain and palpitations  Gastrointestinal: Positive for constipation  Negative for abdominal pain, diarrhea and nausea  Occasional constipation  Not real good at water intake  Endocrine: Positive for polydipsia  Negative for polyphagia and polyuria  Nocturia occasionally once a night  Genitourinary: Positive for urgency  Musculoskeletal: Positive for arthralgias  Negative for myalgias  Pain in hands recently especially left thumb, has dupuytren's contracture  Skin: Negative for wound  Neurological: Positive for numbness  Negative for dizziness, weakness, light-headedness and headaches  Has numbness and tingling of the feet slightly worse  Psychiatric/Behavioral: Positive for sleep disturbance  Wakes early in the night         Historical Information   Past Medical History:   Diagnosis Date    Dupuytren's contracture of hand     bilateral    GERD (gastroesophageal reflux disease)     Migraine     Osteoarthritis     Seasonal allergies      Past Surgical History:   Procedure Laterality Date    CATARACT EXTRACTION, BILATERAL Bilateral     INGUINAL HERNIA REPAIR      VASECTOMY       Social History   Social History     Substance and Sexual Activity   Alcohol Use No     Social History     Substance and Sexual Activity   Drug Use No     Social History     Tobacco Use   Smoking Status Never Smoker   Smokeless Tobacco Never Used     Family History:   Family History   Problem Relation Age of Onset    Hypertension Mother  Dementia Mother     Diabetes type II Mother     Lung cancer Father     Diabetes type II Father     Diabetes type II Sister     Arthritis Sister     Diabetes type II Brother     Diabetes unspecified Sister         prediabetes    Arthritis Sister     Hypertension Son     Obesity Son        Meds/Allergies   Current Outpatient Medications   Medication Sig Dispense Refill    Ascorbic Acid (VITAMIN C) 1000 MG tablet Take 1,000 mg by mouth daily      aspirin (ASPIRIN 81) 81 mg chewable tablet Chew 81 mg daily      Canagliflozin (INVOKANA) 300 MG TABS Take 1 tablet (300 mg total) by mouth daily before breakfast 30 tablet 5    glipiZIDE (GLUCOTROL) 5 mg tablet 3 Tablets in the am and 1 tablet in the pm  360 tablet 1    glucose blood (ONE TOUCH ULTRA TEST) test strip Check blood sugars twice daily 200 each 3    lisinopril-hydrochlorothiazide (PRINZIDE,ZESTORETIC) 10-12 5 MG per tablet Take 1 tablet by mouth daily      Loratadine-Pseudoephedrine (CLARITIN-D 12 HOUR PO) Take 1 tablet by mouth daily        metFORMIN (GLUCOPHAGE) 1000 MG tablet Take 1 tablet (1,000 mg total) by mouth 2 (two) times a day with meals 180 tablet 3    Multiple Vitamin (MULTI-VITAMIN DAILY PO) Take by mouth daily        omeprazole (PriLOSEC) 20 mg delayed release capsule Take 20 mg by mouth daily      ONETOUCH DELICA LANCETS FINE MISC Check glucose twice daily 100 each 3    sitaGLIPtin (JANUVIA) 100 mg tablet Take 1 tablet (100 mg total) by mouth daily 90 tablet 3    atorvastatin (LIPITOR) 10 mg tablet Take 1 tablet (10 mg total) by mouth daily 90 tablet 6     No current facility-administered medications for this visit  Allergies   Allergen Reactions    Iron GI Intolerance     Upsets stomach       Objective   Vitals: Blood pressure 142/84, pulse 74, height 5' 11" (1 803 m), weight 107 kg (235 lb 6 4 oz)  Invasive Devices          None          Physical Exam   Constitutional: He is oriented to person, place, and time  He appears well-developed and well-nourished  HENT:   Head: Normocephalic and atraumatic  Eyes: Pupils are equal, round, and reactive to light  Conjunctivae and EOM are normal    Neck: Normal range of motion  Neck supple  No thyromegaly present  No carotid bruits  Cardiovascular: Normal rate, regular rhythm, normal heart sounds and intact distal pulses  No murmur heard  Pulmonary/Chest: Effort normal and breath sounds normal  He has no wheezes  Abdominal: Soft  Bowel sounds are normal  There is no tenderness  Musculoskeletal: Normal range of motion  He exhibits no edema or deformity  Callus lateral 5th mp joint on the left and medial aspect of the 1st toes bilaterally  No ulcerations of the feet  Lymphadenopathy:     He has no cervical adenopathy  Neurological: He is alert and oriented to person, place, and time  He has normal reflexes  Skin: Skin is warm and dry  No rash noted  No erythema  Vitals reviewed  The history was obtained from the review of the chart and from the patient  Lab Results:    Most recent Alc is  Lab Results   Component Value Date    HGBA1C 7 9 02/08/2019           CMP done on 02/08/2019 demonstrates a glucose of 163 fasting but was otherwise normal   CBC was normal   Total cholesterol 210, triglyceride 126, HDL 55,   TSH is 1 62      Future Appointments   Date Time Provider [place] Loving   7/9/2019  9:40 AM Glenny Li MD [facility]

## 2019-04-11 DIAGNOSIS — E11.8 TYPE 2 DIABETES MELLITUS WITH COMPLICATION, WITHOUT LONG-TERM CURRENT USE OF INSULIN (HCC): ICD-10-CM

## 2019-04-11 DIAGNOSIS — E11.8 TYPE 2 DIABETES MELLITUS WITH COMPLICATION, UNSPECIFIED WHETHER LONG TERM INSULIN USE: ICD-10-CM

## 2019-04-11 RX ORDER — GLIPIZIDE 5 MG/1
TABLET ORAL
Qty: 360 TABLET | Refills: 2 | Status: SHIPPED | OUTPATIENT
Start: 2019-04-11 | End: 2019-06-13 | Stop reason: SDUPTHER

## 2019-06-13 DIAGNOSIS — E11.8 TYPE 2 DIABETES MELLITUS WITH COMPLICATION, WITHOUT LONG-TERM CURRENT USE OF INSULIN (HCC): ICD-10-CM

## 2019-06-13 RX ORDER — GLIPIZIDE 5 MG/1
TABLET ORAL
Qty: 360 TABLET | Refills: 2 | Status: SHIPPED | OUTPATIENT
Start: 2019-06-13 | End: 2020-01-24

## 2019-07-03 LAB
CREAT ?TM UR-SCNC: 68.7 UMOL/L
HBA1C MFR BLD HPLC: 8.4 %
MICROALBUMIN/CREAT UR: <4.4 MG/G{CREAT}

## 2019-07-09 ENCOUNTER — OFFICE VISIT (OUTPATIENT)
Dept: ENDOCRINOLOGY | Facility: HOSPITAL | Age: 69
End: 2019-07-09
Payer: MEDICARE

## 2019-07-09 VITALS
DIASTOLIC BLOOD PRESSURE: 72 MMHG | WEIGHT: 236.8 LBS | HEIGHT: 71 IN | SYSTOLIC BLOOD PRESSURE: 138 MMHG | HEART RATE: 94 BPM | BODY MASS INDEX: 33.15 KG/M2

## 2019-07-09 DIAGNOSIS — I10 ESSENTIAL HYPERTENSION: ICD-10-CM

## 2019-07-09 DIAGNOSIS — E78.2 MIXED HYPERLIPIDEMIA: ICD-10-CM

## 2019-07-09 DIAGNOSIS — E11.8 TYPE 2 DIABETES MELLITUS WITH COMPLICATION, UNSPECIFIED WHETHER LONG TERM INSULIN USE: ICD-10-CM

## 2019-07-09 DIAGNOSIS — E11.40 TYPE 2 DIABETES MELLITUS WITH DIABETIC NEUROPATHY, WITHOUT LONG-TERM CURRENT USE OF INSULIN (HCC): Primary | ICD-10-CM

## 2019-07-09 PROCEDURE — 99215 OFFICE O/P EST HI 40 MIN: CPT | Performed by: INTERNAL MEDICINE

## 2019-07-09 RX ORDER — BLOOD-GLUCOSE METER
EACH MISCELLANEOUS
Qty: 1 EACH | Refills: 0 | Status: SHIPPED | OUTPATIENT
Start: 2019-07-09

## 2019-07-09 RX ORDER — FERROUS SULFATE 325(65) MG
325 TABLET ORAL
COMMUNITY
End: 2019-07-09 | Stop reason: ALTCHOICE

## 2019-07-09 NOTE — PROGRESS NOTES
7/9/2019    Assessment/Plan      Diagnoses and all orders for this visit:    Type 2 diabetes mellitus with diabetic neuropathy, without long-term current use of insulin [facility]  -     HEMOGLOBIN A1C W/ EAG ESTIMATION Lab Collect; Future  -     Comprehensive metabolic panel Lab Collect; Future  -     Blood Glucose Monitoring Suppl (ONE TOUCH ULTRA 2) w/Device KIT; Use once daily    Essential hypertension  -     HEMOGLOBIN A1C W/ EAG ESTIMATION Lab Collect; Future  -     Comprehensive metabolic panel Lab Collect; Future    Mixed hyperlipidemia  -     HEMOGLOBIN A1C W/ EAG ESTIMATION Lab Collect; Future  -     Comprehensive metabolic panel Lab Collect; Future    Type 2 diabetes mellitus with complication, unspecified whether long term insulin use (HCC)  -     Discontinue: Neel SOL; Check glucose twice daily    Other orders  -     Discontinue: ferrous sulfate 325 (65 Fe) mg tablet; Take 325 mg by mouth daily with breakfast        Assessment/Plan:   1  Type 2 diabetes  His most recent hemoglobin A1c has gone up to 8 4%  This signifies not well controlled diabetes  He admits that he has been less active and eating poorly and would like to get this on track at this point  He is on the maximum of 4 medications for diabetes some eye only option after this would be to add something like Actos or insulin therapy  He promises he will get his activity under control to bring his blood sugars back down  He will check his blood sugars at least once a day  2  Diabetic neuropathy  He still has some foot numbness and diabetic foot exams are performed in the endocrine office on a regular basis  3  Hypertension  Most recent blood pressure is under good control on his current lisinopril / HCTZ  4  Hyperlipidemia  Lipid panel significantly improved with low dose of atorvastatin and he will continue this medication      I have asked him to follow up in 3 months with preceding hemoglobin A1c and CMP       CC: Diabetes Type 2, lipid, blood pressure follow-up    History of Present Illness     HPI: Sebastián Trinidad is a 79y o  year old male with type 2 diabetes for 8 years  He is on oral agents at home and takes   Januvia 100 mg daily, metformin 1000 mg twice a day, Invokana 300 mg daily, and glipizide 5 mg 3 tablets in the morning and 1 tablet in the evening  He denies any polyuria, polyphagia, and blurry vision  He has polydipsia with dry mouth and nocturia occasionally  He has some numbness of his feet unchanged  He denies chest pain or shortness of breath  He denies nephropathy, retinopathy, heart attack, stroke and claudication but does admit to neuropathy  Admits to not as active since last visit  Struggles in the hot and humid weather  Hypoglycemic episodes: No never  The patient's last eye exam was in  October 2018 with no retinopathy  The patient's last foot exam was in  October 2018 at endocrine office visit  Last A1C was   Lab Results   Component Value Date    HGBA1C 7 9 02/08/2019     Blood Sugar/Glucometer/Pump/CGM review: checks blood sugars once daily in am   Before breakfast: 138-196, occasional 200s    He has hypertension and takes lisinopril / HCTZ 10/12 5 mg daily  He denies headache or stroke-like symptoms  He has hyperlipidemia and takes atorvastatin 10 mg daily  He denies chest pain or shortness of breath  Review of Systems   Constitutional: Negative for fatigue and unexpected weight change  HENT: Negative for trouble swallowing  Eyes: Negative for visual disturbance  Wears glasses  Respiratory: Negative for chest tightness and shortness of breath  Cardiovascular: Negative for chest pain, palpitations and leg swelling  Gastrointestinal: Positive for constipation  Negative for abdominal pain, diarrhea and nausea  A bit constipated recently  Endocrine: Positive for polydipsia  Negative for polyphagia and polyuria          Nocturia occasionally  Drinks a lot of water with thirst and dry mouth  Genitourinary: Positive for urgency  Musculoskeletal: Positive for myalgias  Some myalgias on the new atorvastatin  Skin: Negative for wound  Neurological: Positive for numbness  Negative for dizziness, weakness, light-headedness and headaches  Feet still numb and hands still hurt  Has posterior right heel bone spur, struggles to get shoes that fit  Psychiatric/Behavioral: Positive for sleep disturbance  Has some troubles with staying asleep, using some tylenol Pm         Historical Information   Past Medical History:   Diagnosis Date    Dupuytren's contracture of hand     bilateral    GERD (gastroesophageal reflux disease)     Migraine     Osteoarthritis     Seasonal allergies      Past Surgical History:   Procedure Laterality Date    CATARACT EXTRACTION, BILATERAL Bilateral     INGUINAL HERNIA REPAIR      VASECTOMY       Social History   Social History     Substance and Sexual Activity   Alcohol Use No     Social History     Substance and Sexual Activity   Drug Use No     Social History     Tobacco Use   Smoking Status Never Smoker   Smokeless Tobacco Never Used     Family History:   Family History   Problem Relation Age of Onset    Hypertension Mother     Dementia Mother     Diabetes type II Mother     Lung cancer Father     Diabetes type II Father     Diabetes type II Sister     Arthritis Sister     Diabetes type II Brother     Diabetes unspecified Sister         prediabetes    Arthritis Sister     Hypertension Son     Obesity Son        Meds/Allergies   Current Outpatient Medications   Medication Sig Dispense Refill    Ascorbic Acid (VITAMIN C) 1000 MG tablet Take 1,000 mg by mouth daily      aspirin (ASPIRIN 81) 81 mg chewable tablet Chew 81 mg daily      atorvastatin (LIPITOR) 10 mg tablet Take 1 tablet (10 mg total) by mouth daily 90 tablet 6    Canagliflozin (INVOKANA) 300 MG TABS Take 1 tablet (300 mg total) by mouth daily before breakfast 30 tablet 5    glipiZIDE (GLUCOTROL) 5 mg tablet 3 Tablets in the am and 1 tablet in the pm  360 tablet 2    glucose blood (ONE TOUCH ULTRA TEST) test strip Check blood sugars twice daily 200 each 3    lisinopril-hydrochlorothiazide (PRINZIDE,ZESTORETIC) 10-12 5 MG per tablet Take 1 tablet by mouth daily      Loratadine-Pseudoephedrine (CLARITIN-D 12 HOUR PO) Take 1 tablet by mouth daily        metFORMIN (GLUCOPHAGE) 1000 MG tablet Take 1 tablet (1,000 mg total) by mouth 2 (two) times a day with meals 180 tablet 3    Multiple Vitamin (MULTI-VITAMIN DAILY PO) Take by mouth daily        omeprazole (PriLOSEC) 20 mg delayed release capsule Take 20 mg by mouth daily      sitaGLIPtin (JANUVIA) 100 mg tablet Take 1 tablet (100 mg total) by mouth daily 90 tablet 3    Blood Glucose Monitoring Suppl (ONE TOUCH ULTRA 2) w/Device KIT Use once daily 1 each 0    ONETOUCH DELICA LANCETS FINE MISC Check glucose once daily 100 each 3     No current facility-administered medications for this visit  Allergies   Allergen Reactions    Iron GI Intolerance     Upsets stomach       Objective   Vitals: Blood pressure 138/72, pulse 94, height 5' 11" (1 803 m), weight 107 kg (236 lb 12 8 oz)  Invasive Devices     None                 Physical Exam   Constitutional: He is oriented to person, place, and time  He appears well-developed and well-nourished  HENT:   Head: Normocephalic and atraumatic  Eyes: Pupils are equal, round, and reactive to light  Conjunctivae and EOM are normal    Neck: Normal range of motion  Neck supple  No thyromegaly present  No carotid bruits  Cardiovascular: Normal rate, regular rhythm, normal heart sounds and intact distal pulses  No murmur heard  Pulmonary/Chest: Effort normal and breath sounds normal  He has no wheezes  Abdominal: Soft  There is no tenderness  Musculoskeletal: Normal range of motion   He exhibits no edema or deformity  No ulcerations of the feet  Lymphadenopathy:     He has no cervical adenopathy  Neurological: He is alert and oriented to person, place, and time  He has normal reflexes  Skin: Skin is warm and dry  No rash noted  No erythema  Vitals reviewed  The history was obtained from the review of the chart and from the patient  Lab Results:     [facility] done at [facility] on 07/03/2019 showed hemoglobin A1c of 8 4%  CMP showed a glucose of 162 fasting but was otherwise normal   Total cholesterol 176, triglyceride 138, HDL 52, LDL 96  Urine microalbumin to creatinine ratio was less than 4 4       Future Appointments   Date Time Provider [place] Loving   10/10/2019  9:00 AM Hussain Almanza MD [facility]

## 2019-07-09 NOTE — PATIENT INSTRUCTIONS
hgba1c is 8 4%  This is higher  you are on the maximum of all your meds  Work on aggressive and exercise and diet over the next several months  You may need a new diabetes pill or insulin if no better  Continue to check blood sugars once  A day  follow up in 3 months with blood work

## 2019-07-31 DIAGNOSIS — E11.8 TYPE 2 DIABETES MELLITUS WITH COMPLICATION, UNSPECIFIED WHETHER LONG TERM INSULIN USE: ICD-10-CM

## 2019-08-29 DIAGNOSIS — E11.40 TYPE 2 DIABETES MELLITUS WITH DIABETIC NEUROPATHY, WITHOUT LONG-TERM CURRENT USE OF INSULIN (HCC): Primary | ICD-10-CM

## 2019-10-05 LAB
ALBUMIN SERPL-MCNC: 4.3 G/DL (ref 3.6–4.8)
ALBUMIN/GLOB SERPL: 1.7 {RATIO} (ref 1.2–2.2)
ALP SERPL-CCNC: 63 IU/L (ref 39–117)
ALT SERPL-CCNC: 25 IU/L (ref 0–44)
AST SERPL-CCNC: 21 IU/L (ref 0–40)
BILIRUB SERPL-MCNC: 0.5 MG/DL (ref 0–1.2)
BUN SERPL-MCNC: 23 MG/DL (ref 8–27)
BUN/CREAT SERPL: 22 (ref 10–24)
CALCIUM SERPL-MCNC: 9.2 MG/DL (ref 8.6–10.2)
CHLORIDE SERPL-SCNC: 100 MMOL/L (ref 96–106)
CO2 SERPL-SCNC: 23 MMOL/L (ref 20–29)
CREAT SERPL-MCNC: 1.03 MG/DL (ref 0.76–1.27)
EST. AVERAGE GLUCOSE BLD GHB EST-MCNC: 192 MG/DL
GLOBULIN SER-MCNC: 2.6 G/DL (ref 1.5–4.5)
GLUCOSE SERPL-MCNC: 174 MG/DL (ref 65–99)
HBA1C MFR BLD: 8.3 % (ref 4.8–5.6)
POTASSIUM SERPL-SCNC: 4.1 MMOL/L (ref 3.5–5.2)
PROT SERPL-MCNC: 6.9 G/DL (ref 6–8.5)
SL AMB EGFR AFRICAN AMERICAN: 86 ML/MIN/1.73
SL AMB EGFR NON AFRICAN AMERICAN: 75 ML/MIN/1.73
SODIUM SERPL-SCNC: 138 MMOL/L (ref 134–144)

## 2019-10-10 ENCOUNTER — OFFICE VISIT (OUTPATIENT)
Dept: ENDOCRINOLOGY | Facility: HOSPITAL | Age: 69
End: 2019-10-10
Payer: MEDICARE

## 2019-10-10 VITALS
WEIGHT: 238.6 LBS | HEIGHT: 71 IN | SYSTOLIC BLOOD PRESSURE: 132 MMHG | HEART RATE: 86 BPM | BODY MASS INDEX: 33.4 KG/M2 | DIASTOLIC BLOOD PRESSURE: 80 MMHG

## 2019-10-10 DIAGNOSIS — I10 ESSENTIAL HYPERTENSION: ICD-10-CM

## 2019-10-10 DIAGNOSIS — E11.40 TYPE 2 DIABETES MELLITUS WITH DIABETIC NEUROPATHY, WITHOUT LONG-TERM CURRENT USE OF INSULIN (HCC): Primary | ICD-10-CM

## 2019-10-10 DIAGNOSIS — E78.2 MIXED HYPERLIPIDEMIA: ICD-10-CM

## 2019-10-10 PROCEDURE — 99215 OFFICE O/P EST HI 40 MIN: CPT | Performed by: INTERNAL MEDICINE

## 2019-10-10 RX ORDER — NAPROXEN 500 MG/1
500 TABLET ORAL 2 TIMES DAILY WITH MEALS
COMMUNITY
End: 2020-02-06 | Stop reason: ALTCHOICE

## 2019-10-10 NOTE — PROGRESS NOTES
10/10/2019    Assessment/Plan      Diagnoses and all orders for this visit:    Type 2 diabetes mellitus with diabetic neuropathy, without long-term current use of insulin ([facility] [address] )  -     HEMOGLOBIN A1C W/ EAG ESTIMATION Lab Collect; Future  -     Comprehensive metabolic panel Lab Collect; Future  -     Lipid Panel with Direct LDL reflex Lab Collect; Future  -     TSH, 3rd generation Lab Collect; Future    Essential hypertension  -     HEMOGLOBIN A1C W/ EAG ESTIMATION Lab Collect; Future  -     Comprehensive metabolic panel Lab Collect; Future  -     Lipid Panel with Direct LDL reflex Lab Collect; Future  -     TSH, 3rd generation Lab Collect; Future    Mixed hyperlipidemia  -     HEMOGLOBIN A1C W/ EAG ESTIMATION Lab Collect; Future  -     Comprehensive metabolic panel Lab Collect; Future  -     Lipid Panel with Direct LDL reflex Lab Collect; Future  -     TSH, 3rd generation Lab Collect; Future    Other orders  -     naproxen (NAPROSYN) 500 mg tablet; Take 500 mg by mouth 2 (two) times a day with meals        Assessment/Plan:  1  Type 2 diabetes  His most recent hemoglobin A1c is still too high at 8 3% signifying poorly controlled diabetes  It is those stable  I would ideally like to add another agent such as Actos or insulin therapy  He denies allowing me to add another agent  He is maximized on for other agents and will continue the same Januvia, metformin, Invokana, and glipizide  He said he is going to work on getting his diet under control and exercising more  He will check his blood sugars at least once a day  2  Diabetic neuropathy  He has chronic numbness of his feet unchanged  Diabetic foot exam was performed in the office today  3  Hypertension  His blood pressure is under fair control on his current dose of lisinopril / HCTZ  4  Hyperlipidemia  He is currently on atorvastatin daily and I will repeat a lipid panel with his next visit      I have asked him to follow up in 3 months with preceding hemoglobin A1c, CMP, lipid panel, and TSH  CC: Diabetes Type 2, lipid, blood pressure follow-up    History of Present Illness     HPI: Ashley Guido is a 81y o  year old male with type 2 diabetes with neuropathy for 8 years, hypertension, hyperlipidemia here for follow-up  He is on oral agents at home and takes   Januvia 100 mg daily, metformin 1000 mg twice a day, Invokana 300 mg daily, and glipizide 5 mg 3 tablets in the morning and 1 tablet in the evening  He denies any polyuria, polyphagia, and blurry vision  He has polydipsia and occasional nocturia  He has unchanged chronic numbness of his feet  He denies chest pain or shortness of breath  He denies nephropathy, retinopathy, heart attack, stroke and claudication but does admit to neuropathy  Hypoglycemic episodes: No never  The patient's last eye exam was in October 2018 with no retinopathy, has appointment next week  The patient's last foot exam was in  October 2018 at endocrine office visit  Last A1C was   Lab Results   Component Value Date    HGBA1C 8 3 (H) 10/04/2019     Blood Sugar/Glucometer/Pump/CGM review: checks blood sugars once daily in am  Has had a lot of birthday parties recently and eating more/sweets  Before breakfast: 117-170  Before lunch:   Before dinner:   Bedtime:      He has hypertension and takes lisinopril / HCTZ 10/12 5 mg daily  He denies headache or stroke-like symptoms  He has hyperlipidemia and takes atorvastatin 10 mg daily  He denies chest pain or shortness of breath  Review of Systems   Constitutional: Negative for fatigue and unexpected weight change  Less active in general with job  HENT: Negative for trouble swallowing  Eyes: Negative for visual disturbance  Wears glasses  Respiratory: Negative for chest tightness and shortness of breath  Cardiovascular: Negative for chest pain and palpitations     Gastrointestinal: Negative for abdominal pain, constipation, diarrhea and nausea  Endocrine: Positive for polydipsia  Negative for polyphagia and polyuria  Nocturia occasionally once a night  Musculoskeletal: Positive for arthralgias  Left shoulder pain after napping in a bad position  Skin: Negative for wound  Neurological: Positive for numbness  Negative for dizziness, weakness, light-headedness and headaches  Has numbness of the feet  Psychiatric/Behavioral: Negative for sleep disturbance         Historical Information   Past Medical History:   Diagnosis Date    Dupuytren's contracture of hand     bilateral    GERD (gastroesophageal reflux disease)     Migraine     Osteoarthritis     Seasonal allergies      Past Surgical History:   Procedure Laterality Date    CATARACT EXTRACTION, BILATERAL Bilateral     INGUINAL HERNIA REPAIR      VASECTOMY       Social History   Social History     Substance and Sexual Activity   Alcohol Use No     Social History     Substance and Sexual Activity   Drug Use No     Social History     Tobacco Use   Smoking Status Never Smoker   Smokeless Tobacco Never Used     Family History:   Family History   Problem Relation Age of Onset    Hypertension Mother     Dementia Mother     Diabetes type II Mother     Lung cancer Father     Diabetes type II Father     Diabetes type II Sister     Arthritis Sister     Diabetes type II Brother     Diabetes unspecified Sister         prediabetes    Arthritis Sister     Hypertension Son     Obesity Son        Meds/Allergies   Current Outpatient Medications   Medication Sig Dispense Refill    Ascorbic Acid (VITAMIN C) 1000 MG tablet Take 1,000 mg by mouth daily      aspirin (ASPIRIN 81) 81 mg chewable tablet Chew 81 mg daily      atorvastatin (LIPITOR) 10 mg tablet Take 1 tablet (10 mg total) by mouth daily 90 tablet 6    Blood Glucose Monitoring Suppl (ONE TOUCH ULTRA 2) w/Device KIT Use once daily 1 each 0    Canagliflozin (INVOKANA) 300 MG TABS Take 1 tablet (300 mg total) by mouth daily before breakfast 90 tablet 1    glipiZIDE (GLUCOTROL) 5 mg tablet 3 Tablets in the am and 1 tablet in the pm  360 tablet 2    glucose blood (ONE TOUCH ULTRA TEST) test strip Check blood sugars twice daily 200 each 3    lisinopril-hydrochlorothiazide (PRINZIDE,ZESTORETIC) 10-12 5 MG per tablet Take 1 tablet by mouth daily      Loratadine-Pseudoephedrine (CLARITIN-D 12 HOUR PO) Take 1 tablet by mouth daily        metFORMIN (GLUCOPHAGE) 1000 MG tablet Take 1 tablet (1,000 mg total) by mouth 2 (two) times a day with meals 180 tablet 3    Multiple Vitamin (MULTI-VITAMIN DAILY PO) Take by mouth daily        naproxen (NAPROSYN) 500 mg tablet Take 500 mg by mouth 2 (two) times a day with meals      omeprazole (PriLOSEC) 20 mg delayed release capsule Take 20 mg by mouth daily      ONE TOUCH ULTRA TEST test strip Test blood sugar once daily 100 each 3    ONETOUCH DELICA LANCETS FINE MISC Check glucose once daily 100 each 3    sitaGLIPtin (JANUVIA) 100 mg tablet Take 1 tablet (100 mg total) by mouth daily 90 tablet 3     No current facility-administered medications for this visit  Allergies   Allergen Reactions    Iron GI Intolerance     Upsets stomach       Objective   Vitals: Blood pressure 132/80, pulse 86, height 5' 11" (1 803 m), weight 108 kg (238 lb 9 6 oz)  Invasive Devices     None                 Physical Exam   Constitutional: He is oriented to person, place, and time  He appears well-developed and well-nourished  HENT:   Head: Normocephalic and atraumatic  Eyes: Pupils are equal, round, and reactive to light  Conjunctivae and EOM are normal    Neck: Normal range of motion  Neck supple  No thyromegaly present  Thyroid normal in size  No carotid bruits  Cardiovascular: Normal rate, regular rhythm, normal heart sounds and intact distal pulses  Pulses are no weak pulses  No murmur heard    Pulses:       Dorsalis pedis pulses are 2+ on the right side, and 2+ on the left side  Posterior tibial pulses are 2+ on the right side, and 2+ on the left side  Pulmonary/Chest: Effort normal and breath sounds normal  He has no wheezes  Abdominal: Soft  There is no tenderness  Musculoskeletal: Normal range of motion  He exhibits no edema or deformity  No ulcerations of the feet  Feet:   Right Foot:   Skin Integrity: Negative for ulcer, skin breakdown, erythema, warmth, callus or dry skin  Left Foot:   Skin Integrity: Negative for ulcer, skin breakdown, erythema, warmth, callus or dry skin  Lymphadenopathy:     He has no cervical adenopathy  Neurological: He is alert and oriented to person, place, and time  He has normal reflexes  Vibration sensation markedly diminished to the 1st toe DIP joint bilaterally  Microfilament sensation intact bilaterally  Skin: Skin is warm and dry  No rash noted  No erythema  Vitals reviewed  Patient's shoes and socks removed  Right Foot/Ankle   Right Foot Inspection  Skin Exam: skin normal and skin intact no dry skin, no warmth, no callus, no erythema, no maceration, no abnormal color, no pre-ulcer, no ulcer and no callus                          Toe Exam: ROM and strength within normal limits  Sensory   Vibration: diminished    Monofilament testing: intact  Vascular  Capillary refills: < 3 seconds  The right DP pulse is 2+  The right PT pulse is 2+  Left Foot/Ankle  Left Foot Inspection  Skin Exam: skin normal and skin intactno dry skin, no warmth, no erythema, no maceration, normal color, no pre-ulcer, no ulcer and no callus                         Toe Exam: ROM and strength within normal limits                   Sensory   Vibration: diminished    Monofilament: intact  Vascular  Capillary refills: < 3 seconds  The left DP pulse is 2+  The left PT pulse is 2+  Assign Risk Category:  No deformity present; Loss of protective sensation;  No weak pulses       Risk: 1      The history was obtained from the review of the chart and from the patient      Lab Results:    Most recent Alc is  Lab Results   Component Value Date    HGBA1C 8 3 (H) 10/04/2019            CMP done on 10/04/2019 showed a glucose of 174 random but was otherwise normal   Lab Results   Component Value Date    CREATININE 1 03 10/04/2019    CREATININE 0 93 10/09/2018    CREATININE 1 01 06/25/2018    BUN 23 10/04/2019    K 4 1 10/04/2019     10/04/2019    CO2 23 10/04/2019     Lab Results   Component Value Date    ALT 25 10/04/2019    AST 21 10/04/2019       Future Appointments   Date Time Provider [facility]   2/6/2020  9:40 AM Dorma Mohs, MD ENDO [id number] [facility]

## 2019-10-10 NOTE — PATIENT INSTRUCTIONS
hgab1c is 8 3%  This is still too high but stable  For now continue he same [place], invokana, glipizide, and metformin  We can add actos or insulin  You need to get the diet under control and exercise more regularly  Continue to check blood sugars once daily  Follow up in 3 months with blood work

## 2019-10-15 LAB
LEFT EYE DIABETIC RETINOPATHY: NORMAL
RIGHT EYE DIABETIC RETINOPATHY: NORMAL

## 2020-01-24 DIAGNOSIS — E11.8 TYPE 2 DIABETES MELLITUS WITH COMPLICATION, WITHOUT LONG-TERM CURRENT USE OF INSULIN (HCC): ICD-10-CM

## 2020-01-24 RX ORDER — GLIPIZIDE 5 MG/1
TABLET ORAL
Qty: 360 TABLET | Refills: 0 | Status: SHIPPED | OUTPATIENT
Start: 2020-01-24 | End: 2020-02-06 | Stop reason: SDUPTHER

## 2020-01-31 LAB — HBA1C MFR BLD HPLC: 8.3 %

## 2020-02-06 ENCOUNTER — OFFICE VISIT (OUTPATIENT)
Dept: ENDOCRINOLOGY | Facility: HOSPITAL | Age: 70
End: 2020-02-06
Payer: MEDICARE

## 2020-02-06 VITALS
SYSTOLIC BLOOD PRESSURE: 138 MMHG | HEART RATE: 76 BPM | WEIGHT: 234.2 LBS | DIASTOLIC BLOOD PRESSURE: 74 MMHG | HEIGHT: 71 IN | BODY MASS INDEX: 32.79 KG/M2

## 2020-02-06 DIAGNOSIS — E11.40 TYPE 2 DIABETES MELLITUS WITH DIABETIC NEUROPATHY, WITHOUT LONG-TERM CURRENT USE OF INSULIN (HCC): Primary | ICD-10-CM

## 2020-02-06 DIAGNOSIS — I10 ESSENTIAL HYPERTENSION: ICD-10-CM

## 2020-02-06 DIAGNOSIS — E11.65 TYPE 2 DIABETES MELLITUS WITH HYPERGLYCEMIA, WITHOUT LONG-TERM CURRENT USE OF INSULIN (HCC): ICD-10-CM

## 2020-02-06 DIAGNOSIS — E11.8 TYPE 2 DIABETES MELLITUS WITH COMPLICATION, WITHOUT LONG-TERM CURRENT USE OF INSULIN (HCC): ICD-10-CM

## 2020-02-06 DIAGNOSIS — E78.2 MIXED HYPERLIPIDEMIA: ICD-10-CM

## 2020-02-06 PROCEDURE — 99215 OFFICE O/P EST HI 40 MIN: CPT | Performed by: INTERNAL MEDICINE

## 2020-02-06 RX ORDER — LANCETS 33 GAUGE
EACH MISCELLANEOUS
Qty: 100 EACH | Refills: 5 | Status: SHIPPED | OUTPATIENT
Start: 2020-02-06 | End: 2020-08-28 | Stop reason: SDUPTHER

## 2020-02-06 RX ORDER — GLIPIZIDE 5 MG/1
TABLET ORAL
Qty: 360 TABLET | Refills: 0 | Status: SHIPPED | OUTPATIENT
Start: 2020-02-06 | End: 2020-05-18 | Stop reason: SDUPTHER

## 2020-02-06 NOTE — PROGRESS NOTES
2/6/2020    Assessment/Plan      Diagnoses and all orders for this visit:    Type 2 diabetes mellitus with diabetic neuropathy, without long-term current use of insulin ([facility] )  -     HEMOGLOBIN A1C W/ EAG ESTIMATION Lab Collect; Future  -     Comprehensive metabolic panel Lab Collect; Future  -     sitaGLIPtin (JANUVIA) 100 mg tablet; Take 1 tablet (100 mg total) by mouth daily  -     ONE TOUCH ULTRA TEST test strip; Test blood sugar once daily  -     Lancets (ONETOUCH DELICA PLUS [id number]) MISC; Use to test blood sugars once a day    Essential hypertension  -     HEMOGLOBIN A1C W/ EAG ESTIMATION Lab Collect; Future  -     Comprehensive metabolic panel Lab Collect; Future    Mixed hyperlipidemia  -     HEMOGLOBIN A1C W/ EAG ESTIMATION Lab Collect; Future  -     Comprehensive metabolic panel Lab Collect; Future    Type 2 diabetes mellitus with hyperglycemia, without long-term current use of insulin ([facility])  -     Canagliflozin (INVOKANA) 300 MG TABS; Take 1 tablet (300 mg total) by mouth daily before breakfast    Type 2 diabetes mellitus with complication, without long-term current use of insulin ([facility])  -     metFORMIN (GLUCOPHAGE) 1000 MG tablet; Take 1 tablet (1,000 mg total) by mouth 2 (two) times a day with meals  -     glipiZIDE (GLUCOTROL) 5 mg tablet; TAKE 3 TABLETS BY MOUTH IN THE MORNING, AND 1 TABLET BY MOUTH IN THE EVENING  Assessment/Plan:  1  Type 2 diabetes  Most recent hemoglobin A1c is 8 3%  This has not improved  He is going to work on aggressive diet and exercise  For now, he will continue the same glipizide, metformin, Januvia, or Invokana as he is maximized on all of these agents  I discussed with him possibly adding Actos or pioglitazone at low-dose which is safe to add in him if his blood sugars do not improve  The only other option would be insulin therapy  We could switch Januvia to a GLP-1 inhibitor which may improve his sugars somewhat but probably not enough    He is also hesitant to do this because of the cost   He will continue to check his blood sugars once a day  2  Hypertension  Most recent blood pressure is under fair control  He will continue the same lisinopril/HCTZ  3  Hyperlipidemia  Most recent lipid profile is excellent on his current dose of atorvastatin  4  Diabetic neuropathy  He has chronic unchanged neuropathic symptoms and diabetic foot exams are performed in the office on a regular basis  I have asked him to follow up in 3 months with preceding hemoglobin A1c and CMP  CC: Diabetes type 2, lipid, blood pressure follow-up    History of Present Illness     HPI: Mitra Pepe is a 81y o  year old male with type 2 diabetes with neuropathy for 8 years, hypertension, hyperlipidemia here for follow-up  He is on oral agents at home and takes Invokana 300 mg daily, glipizide 5 mg 3 tablets in the morning and 1 tablet in the evening, metformin 1000 mg twice a day, and Januvia 100 mg daily  He denies any polyuria, polydipsia, polyphagia, and blurry vision  He has dry mouth and once a night nocturia  He has chronic unchanged numbness and tingling of the feet  He denies chest pain or shortness of breath  He denies nephropathy, retinopathy, heart attack, stroke and claudication but does admit to neuropathy  Just started exercise 4-5 times a week after jan 1, 2020 and eating fewer carbs  Hypoglycemic episodes: No never  The patient's last eye exam was in October 2019 with no retinopathy  The patient's last foot exam was in October 2019 at endocrine office visit  Last A1C was   Lab Results   Component Value Date    HGBA1C 8 3 01/31/2020     Blood Sugar/Glucometer/Pump/CGM review: checks blood sugars once a day in the am  Blood sugars cra424 to mid 100s  He has hyperlipidemia and takes atorvastatin 10 mg daily  He denies chest pain or shortness of breath  He has hypertension and takes lisinopril/HCTZ 10/12 5 mg daily    He denies headache or stroke-like symptoms  Review of Systems   Constitutional: Negative for fatigue and unexpected weight change  Weight 4 lb less than last visit  HENT: Negative for trouble swallowing  Eyes: Negative for visual disturbance  Wears glasses  Respiratory: Negative for chest tightness and shortness of breath  Cardiovascular: Negative for chest pain, palpitations and leg swelling  Gastrointestinal: Positive for constipation  Negative for abdominal pain, diarrhea and nausea  Occasional constipation  Endocrine: Negative for polydipsia, polyphagia and polyuria  Nocturia once a night  Has dry mouth  Skin: Negative for wound  Neurological: Positive for numbness  Negative for dizziness, weakness, light-headedness and headaches  Has callus on left foot and painful at times  Still unchanged numbness and tingling of the feet  More pains in the hands  Can get a bit dizzy when lays in bed for about 1 min  Psychiatric/Behavioral: Negative for sleep disturbance         Historical Information   Past Medical History:   Diagnosis Date    Dupuytren's contracture of hand     bilateral    GERD (gastroesophageal reflux disease)     Migraine     Osteoarthritis     Seasonal allergies      Past Surgical History:   Procedure Laterality Date    CATARACT EXTRACTION, BILATERAL Bilateral     INGUINAL HERNIA REPAIR      VASECTOMY       Social History   Social History     Substance and Sexual Activity   Alcohol Use No     Social History     Substance and Sexual Activity   Drug Use No     Social History     Tobacco Use   Smoking Status Never Smoker   Smokeless Tobacco Never Used     Family History:   Family History   Problem Relation Age of Onset    Hypertension Mother     Dementia Mother     Diabetes type II Mother     Lung cancer Father     Diabetes type II Father     Diabetes type II Sister     Arthritis Sister     Diabetes type II Brother     Diabetes unspecified Sister prediabetes    Arthritis Sister     Hypertension Son     Obesity Son        Meds/Allergies   Current Outpatient Medications   Medication Sig Dispense Refill    Ascorbic Acid (VITAMIN C) 1000 MG tablet Take 1,000 mg by mouth daily      aspirin (ASPIRIN 81) 81 mg chewable tablet Chew 81 mg daily      atorvastatin (LIPITOR) 10 mg tablet Take 1 tablet (10 mg total) by mouth daily 90 tablet 6    Blood Glucose Monitoring Suppl (ONE TOUCH ULTRA 2) w/Device KIT Use once daily 1 each 0    Canagliflozin (INVOKANA) 300 MG TABS Take 1 tablet (300 mg total) by mouth daily before breakfast 90 tablet 1    glipiZIDE (GLUCOTROL) 5 mg tablet TAKE 3 TABLETS BY MOUTH IN THE MORNING, AND 1 TABLET BY MOUTH IN THE EVENING  360 tablet 0    glucose blood (ONE TOUCH ULTRA TEST) test strip Check blood sugars twice daily 200 each 3    lisinopril-hydrochlorothiazide (PRINZIDE,ZESTORETIC) 10-12 5 MG per tablet Take 1 tablet by mouth daily      Loratadine-Pseudoephedrine (CLARITIN-D 12 HOUR PO) Take 1 tablet by mouth daily        metFORMIN (GLUCOPHAGE) 1000 MG tablet Take 1 tablet (1,000 mg total) by mouth 2 (two) times a day with meals 180 tablet 3    Multiple Vitamin (MULTI-VITAMIN DAILY PO) Take by mouth daily        omeprazole (PriLOSEC) 20 mg delayed release capsule Take 20 mg by mouth daily      ONE TOUCH ULTRA TEST test strip Test blood sugar once daily 100 each 3    sitaGLIPtin (JANUVIA) 100 mg tablet Take 1 tablet (100 mg total) by mouth daily 90 tablet 3    Lancets (ONETOUCH DELICA PLUS [id number]) MISC Use to test blood sugars once a day 100 each 5     No current facility-administered medications for this visit  Allergies   Allergen Reactions    Iron GI Intolerance     Upsets stomach       Objective   Vitals: Blood pressure 138/74, pulse 76, height 5' 11" (1 803 m), weight 106 kg (234 lb 3 2 oz)  Invasive Devices     None                 Physical Exam   Constitutional: He is oriented to person, place, and time  He appears well-developed and well-nourished  HENT:   Head: Normocephalic and atraumatic  Eyes: Pupils are equal, round, and reactive to light  Conjunctivae and EOM are normal    Neck: Normal range of motion  Neck supple  No thyromegaly present  Thyroid normal in size  No carotid bruits  Cardiovascular: Normal rate, regular rhythm and normal heart sounds  No murmur heard  1+ dorsalis pedis and posterior tibialis pulses bilaterally  Pulmonary/Chest: Effort normal and breath sounds normal  He has no wheezes  Abdominal: There is no tenderness  Musculoskeletal: Normal range of motion  He exhibits deformity  He exhibits no edema  Callus plantar surface left 5th MP joint and lateral side on 5th MP joint  No ulcerations of the feet  Lymphadenopathy:     He has no cervical adenopathy  Neurological: He is alert and oriented to person, place, and time  He has normal reflexes  Skin: Skin is warm and dry  No rash noted  No erythema  Vitals reviewed  The history was obtained from the review of the chart and from the patient  Lab Results:    Most recent Alc is  Lab Results   Component Value Date    HGBA1C 8 3 01/31/2020           CMP done on 01/31/2020 showed a glucose of 141 fasting but was otherwise normal   Lab Results   Component Value Date    CREATININE 1 03 10/04/2019    CREATININE 0 93 10/09/2018    CREATININE 1 01 06/25/2018    BUN 23 10/04/2019    K 4 1 10/04/2019     10/04/2019    CO2 23 10/04/2019     Total cholesterol 144, LDL cholesterol 68     Lab Results   Component Value Date    HDL 54 06/25/2018    TRIG 155 (H) 06/25/2018     Lab Results   Component Value Date    ALT 25 10/04/2019    AST 21 10/04/2019     Lab Results   Component Value Date    TSH 1 500 10/09/2018       Future Appointments   Date Time Provider [place] [facility]   5/19/2020 11:20 AM Cara Thompson MD [facility]O [facility]

## 2020-02-06 NOTE — PATIENT INSTRUCTIONS
hgba1c is 8 3%  This is no better  Continue the same glipizide, metformin, januvia, and invokana for now  Continue the aggressive diet and exercise regularly  continue to check blood sugars once daily  We may consider Actos/pioglitazone in low dose next visit if sugars not better  Follow up in 3 months with blood work

## 2020-02-26 DIAGNOSIS — E11.40 TYPE 2 DIABETES MELLITUS WITH DIABETIC NEUROPATHY, WITHOUT LONG-TERM CURRENT USE OF INSULIN (HCC): ICD-10-CM

## 2020-02-26 NOTE — TELEPHONE ENCOUNTER
Pt is asking for a 90 day script to go to [facility] please  Pt just saw you on 2/6 and has a follow up in May

## 2020-03-15 DIAGNOSIS — E11.40 TYPE 2 DIABETES MELLITUS WITH DIABETIC NEUROPATHY, WITHOUT LONG-TERM CURRENT USE OF INSULIN (HCC): ICD-10-CM

## 2020-03-16 RX ORDER — SITAGLIPTIN 100 MG/1
TABLET, FILM COATED ORAL
Qty: 90 TABLET | Refills: 3 | Status: SHIPPED | OUTPATIENT
Start: 2020-03-16 | End: 2021-03-11 | Stop reason: SDUPTHER

## 2020-04-20 DIAGNOSIS — E11.65 TYPE 2 DIABETES MELLITUS WITH HYPERGLYCEMIA, WITHOUT LONG-TERM CURRENT USE OF INSULIN (HCC): ICD-10-CM

## 2020-05-14 LAB
ALBUMIN SERPL-MCNC: 4.8 G/DL (ref 3.8–4.8)
ALBUMIN/GLOB SERPL: 2 {RATIO} (ref 1.2–2.2)
ALP SERPL-CCNC: 59 IU/L (ref 39–117)
ALT SERPL-CCNC: 26 IU/L (ref 0–44)
AST SERPL-CCNC: 22 IU/L (ref 0–40)
BILIRUB SERPL-MCNC: 0.3 MG/DL (ref 0–1.2)
BUN SERPL-MCNC: 18 MG/DL (ref 8–27)
BUN/CREAT SERPL: 16 (ref 10–24)
CALCIUM SERPL-MCNC: 9.8 MG/DL (ref 8.6–10.2)
CHLORIDE SERPL-SCNC: 100 MMOL/L (ref 96–106)
CO2 SERPL-SCNC: 23 MMOL/L (ref 20–29)
CREAT SERPL-MCNC: 1.12 MG/DL (ref 0.76–1.27)
EST. AVERAGE GLUCOSE BLD GHB EST-MCNC: 194 MG/DL
GLOBULIN SER-MCNC: 2.4 G/DL (ref 1.5–4.5)
GLUCOSE SERPL-MCNC: 144 MG/DL (ref 65–99)
HBA1C MFR BLD: 8.4 % (ref 4.8–5.6)
POTASSIUM SERPL-SCNC: 4.3 MMOL/L (ref 3.5–5.2)
PROT SERPL-MCNC: 7.2 G/DL (ref 6–8.5)
SL AMB EGFR AFRICAN AMERICAN: 78 ML/MIN/1.73
SL AMB EGFR NON AFRICAN AMERICAN: 68 ML/MIN/1.73
SODIUM SERPL-SCNC: 139 MMOL/L (ref 134–144)

## 2020-05-15 ENCOUNTER — TELEPHONE (OUTPATIENT)
Dept: ENDOCRINOLOGY | Facility: HOSPITAL | Age: 70
End: 2020-05-15

## 2020-05-18 ENCOUNTER — OFFICE VISIT (OUTPATIENT)
Dept: ENDOCRINOLOGY | Facility: HOSPITAL | Age: 70
End: 2020-05-18
Payer: MEDICARE

## 2020-05-18 VITALS
BODY MASS INDEX: 32.9 KG/M2 | HEIGHT: 71 IN | SYSTOLIC BLOOD PRESSURE: 138 MMHG | TEMPERATURE: 97.7 F | DIASTOLIC BLOOD PRESSURE: 72 MMHG | HEART RATE: 91 BPM | WEIGHT: 235 LBS

## 2020-05-18 DIAGNOSIS — E11.40 TYPE 2 DIABETES MELLITUS WITH DIABETIC NEUROPATHY, WITHOUT LONG-TERM CURRENT USE OF INSULIN (HCC): Primary | ICD-10-CM

## 2020-05-18 DIAGNOSIS — E11.65 TYPE 2 DIABETES MELLITUS WITH HYPERGLYCEMIA, WITHOUT LONG-TERM CURRENT USE OF INSULIN (HCC): ICD-10-CM

## 2020-05-18 DIAGNOSIS — E11.8 TYPE 2 DIABETES MELLITUS WITH COMPLICATION, WITHOUT LONG-TERM CURRENT USE OF INSULIN (HCC): ICD-10-CM

## 2020-05-18 DIAGNOSIS — I10 ESSENTIAL HYPERTENSION: ICD-10-CM

## 2020-05-18 DIAGNOSIS — E78.2 MIXED HYPERLIPIDEMIA: ICD-10-CM

## 2020-05-18 PROCEDURE — 99215 OFFICE O/P EST HI 40 MIN: CPT | Performed by: INTERNAL MEDICINE

## 2020-05-18 RX ORDER — PIOGLITAZONEHYDROCHLORIDE 15 MG/1
15 TABLET ORAL DAILY
Qty: 90 TABLET | Refills: 3 | Status: SHIPPED | OUTPATIENT
Start: 2020-05-18 | End: 2021-03-11 | Stop reason: SDUPTHER

## 2020-05-18 RX ORDER — GLIPIZIDE 5 MG/1
TABLET ORAL
Qty: 360 TABLET | Refills: 3 | Status: SHIPPED | OUTPATIENT
Start: 2020-05-18 | End: 2021-03-11

## 2020-06-18 DIAGNOSIS — E78.2 MIXED HYPERLIPIDEMIA: ICD-10-CM

## 2020-06-18 RX ORDER — ATORVASTATIN CALCIUM 10 MG/1
TABLET, FILM COATED ORAL
Qty: 90 TABLET | Refills: 6 | Status: SHIPPED | OUTPATIENT
Start: 2020-06-18 | End: 2021-07-19

## 2020-08-27 LAB
ALBUMIN SERPL-MCNC: 4.3 G/DL (ref 3.8–4.8)
ALBUMIN/CREAT UR: 4 MG/G CREAT (ref 0–29)
ALBUMIN/GLOB SERPL: 1.6 {RATIO} (ref 1.2–2.2)
ALP SERPL-CCNC: 56 IU/L (ref 39–117)
ALT SERPL-CCNC: 16 IU/L (ref 0–44)
AST SERPL-CCNC: 16 IU/L (ref 0–40)
BILIRUB SERPL-MCNC: 0.4 MG/DL (ref 0–1.2)
BUN SERPL-MCNC: 24 MG/DL (ref 8–27)
BUN/CREAT SERPL: 23 (ref 10–24)
CALCIUM SERPL-MCNC: 9.4 MG/DL (ref 8.6–10.2)
CHLORIDE SERPL-SCNC: 101 MMOL/L (ref 96–106)
CHOLEST SERPL-MCNC: 180 MG/DL (ref 100–199)
CO2 SERPL-SCNC: 23 MMOL/L (ref 20–29)
CREAT SERPL-MCNC: 1.05 MG/DL (ref 0.76–1.27)
CREAT UR-MCNC: 110.4 MG/DL
EST. AVERAGE GLUCOSE BLD GHB EST-MCNC: 186 MG/DL
GLOBULIN SER-MCNC: 2.7 G/DL (ref 1.5–4.5)
GLUCOSE SERPL-MCNC: 150 MG/DL (ref 65–99)
HBA1C MFR BLD: 8.1 % (ref 4.8–5.6)
HDLC SERPL-MCNC: 61 MG/DL
LDLC SERPL CALC-MCNC: 93 MG/DL (ref 0–99)
LDLC/HDLC SERPL: 1.5 RATIO (ref 0–3.6)
MICROALBUMIN UR-MCNC: 4 UG/ML
POTASSIUM SERPL-SCNC: 4.3 MMOL/L (ref 3.5–5.2)
PROT SERPL-MCNC: 7 G/DL (ref 6–8.5)
SL AMB EGFR AFRICAN AMERICAN: 84 ML/MIN/1.73
SL AMB EGFR NON AFRICAN AMERICAN: 73 ML/MIN/1.73
SL AMB VLDL CHOLESTEROL CALC: 26 MG/DL (ref 5–40)
SODIUM SERPL-SCNC: 137 MMOL/L (ref 134–144)
TRIGL SERPL-MCNC: 131 MG/DL (ref 0–149)

## 2020-08-28 ENCOUNTER — TELEPHONE (OUTPATIENT)
Dept: ENDOCRINOLOGY | Facility: HOSPITAL | Age: 70
End: 2020-08-28

## 2020-08-28 DIAGNOSIS — E11.40 TYPE 2 DIABETES MELLITUS WITH DIABETIC NEUROPATHY, WITHOUT LONG-TERM CURRENT USE OF INSULIN (HCC): ICD-10-CM

## 2020-08-28 DIAGNOSIS — E11.40 TYPE 2 DIABETES MELLITUS WITH DIABETIC NEUROPATHY, WITHOUT LONG-TERM CURRENT USE OF INSULIN (HCC): Primary | ICD-10-CM

## 2020-08-28 RX ORDER — LANCETS 33 GAUGE
EACH MISCELLANEOUS
Qty: 100 EACH | Refills: 5 | Status: SHIPPED | OUTPATIENT
Start: 2020-08-28 | End: 2020-08-31 | Stop reason: SDUPTHER

## 2020-08-28 NOTE — TELEPHONE ENCOUNTER
I called Claire Damian to check him in for his appt on Tuesday  He mentioned [facility] bc he is out even though it is sooner than it should be  It looks like we did the strips, can a [facility]ets as well?

## 2020-08-31 DIAGNOSIS — E11.40 TYPE 2 DIABETES MELLITUS WITH DIABETIC NEUROPATHY, WITHOUT LONG-TERM CURRENT USE OF INSULIN (HCC): ICD-10-CM

## 2020-08-31 RX ORDER — LANCETS 33 GAUGE
EACH MISCELLANEOUS
Qty: 100 EACH | Refills: 5 | Status: SHIPPED | OUTPATIENT
Start: 2020-08-31 | End: 2021-04-30 | Stop reason: SDUPTHER

## 2020-09-01 ENCOUNTER — OFFICE VISIT (OUTPATIENT)
Dept: ENDOCRINOLOGY | Facility: HOSPITAL | Age: 70
End: 2020-09-01
Payer: MEDICARE

## 2020-09-01 ENCOUNTER — TELEPHONE (OUTPATIENT)
Dept: ENDOCRINOLOGY | Facility: HOSPITAL | Age: 70
End: 2020-09-01

## 2020-09-01 VITALS
DIASTOLIC BLOOD PRESSURE: 72 MMHG | TEMPERATURE: 98 F | SYSTOLIC BLOOD PRESSURE: 138 MMHG | HEART RATE: 82 BPM | WEIGHT: 240.4 LBS | HEIGHT: 71 IN | BODY MASS INDEX: 33.65 KG/M2

## 2020-09-01 DIAGNOSIS — I10 ESSENTIAL HYPERTENSION: ICD-10-CM

## 2020-09-01 DIAGNOSIS — E11.40 TYPE 2 DIABETES MELLITUS WITH DIABETIC NEUROPATHY, WITHOUT LONG-TERM CURRENT USE OF INSULIN (HCC): Primary | ICD-10-CM

## 2020-09-01 DIAGNOSIS — E78.2 MIXED HYPERLIPIDEMIA: ICD-10-CM

## 2020-09-01 PROCEDURE — 99215 OFFICE O/P EST HI 40 MIN: CPT | Performed by: INTERNAL MEDICINE

## 2020-09-01 RX ORDER — BLOOD SUGAR DIAGNOSTIC
STRIP MISCELLANEOUS
Qty: 100 EACH | Refills: 3 | Status: SHIPPED | OUTPATIENT
Start: 2020-09-01 | End: 2021-04-30 | Stop reason: SDUPTHER

## 2020-09-01 NOTE — PROGRESS NOTES
9/2/2020    Assessment/Plan      Diagnoses and all orders for this visit:    Type 2 diabetes mellitus with diabetic neuropathy, without long-term current use of insulin ([facility] [address] )  -     HEMOGLOBIN A1C W/ EAG ESTIMATION Lab Collect; Future  -     Comprehensive metabolic panel Lab Collect; Future    Essential hypertension  -     HEMOGLOBIN A1C W/ EAG ESTIMATION Lab Collect; Future  -     Comprehensive metabolic panel Lab Collect; Future    Mixed hyperlipidemia  -     HEMOGLOBIN A1C W/ EAG ESTIMATION Lab Collect; Future  -     Comprehensive metabolic panel Lab Collect; Future        Assessment/Plan:  1  Type 2 diabetes  Most recent hemoglobin A1c is 8 1%  This has improved but is not yet at goal   The pioglitazone was only added since last visit so I will continue his current metformin, Januvia, glipizide, Invokana, and pioglitazone dosages and give the pioglitazone a bit more time  He will continue to test his blood sugars 1 to 2 times a day  2  Diabetic neuropathy  He has chronic unchanged neuropathic symptoms  Diabetic foot exam was performed in the office today  3  Hypertension  Blood pressure is under fair control on his current dose of lisinopril/HCTZ  4  Hyperlipidemia  Lipid profile is excellent  He will continue the same atorvastatin 10 mg daily  I have asked him to follow up in 3 months with preceding hemoglobin A1c and CMP  CC: Diabetes type 2, blood pressure, lipid follow-up    History of Present Illness     HPI: Xuan Vazquez is a 81y o  year old male with type 2 diabetes with neuropathy for 9 years, hypertension, hyperlipidemia for follow-up visit  He is on oral agents at home and takes Invokana 300 mg daily, Januvia 100 mg daily, metformin 1000 mg twice a day, pioglitazone 15 mg daily, and glipizide 5 mg 3 tablets in the morning 1 tablet in the evening  He denies any polyuria, polydipsia, nocturia and blurry vision  He has polyuria and dry mouth and occasional nocturia    He has chronic unchanged numbness and tingling of the feet  He denies chest pain or shortness of breath  He denies nephropathy, retinopathy, heart attack, stroke and claudication but does admit to neuropathy  Hypoglycemic episodes: Yes occasional  Will see blood sugars now into the 80's and feels a bit strange  The patient's last eye exam was in November 2019  HAS an appoitment next month  The patient's last foot exam was in October 2019 at the endocrine office visit  Last A1C was   Lab Results   Component Value Date    HGBA1C 8 1 (H) 08/26/2020     Blood Sugar/Glucometer/Pump/CGM review:  He checks his blood sugars once a day  Before breakfast: 100-168  Before lunch:   Before dinner:   Bedtime:     He has hyperlipidemia and takes atorvastatin 10 mg daily  He denies chest pain or shortness of breath  He has hypertension and takes lisinopril/HCTZ 10/12 5 mg daily  He denies headache or stroke-like symptoms  Review of Systems   Constitutional: Negative for fatigue and unexpected weight change  Feels eating less in general  Still has variable timing with eating habits  HENT: Negative for trouble swallowing  Eyes: Negative for visual disturbance  Wears glasses  Respiratory: Negative for chest tightness and shortness of breath  Cardiovascular: Negative for chest pain  Gastrointestinal: Positive for blood in stool and constipation  Negative for abdominal pain, diarrhea and nausea  Getting constipated at times, some bleeding hemorrhoidal issues  Endocrine: Positive for polydipsia  Negative for polyphagia and polyuria  Is drinking 6 bottles of water a day, has dry mouth  Nocturia occasionally  Skin: Negative for wound  Still has a haylee on right middle toe on the dorsum, no wound  Neurological: Positive for numbness  Negative for dizziness, weakness, light-headedness and headaches  Has unchanged numbness and tingling of the feet  Psychiatric/Behavioral: Negative for sleep disturbance  Historical Information   Past Medical History:   Diagnosis Date    Dupuytren's contracture of hand     bilateral    GERD (gastroesophageal reflux disease)     Migraine     Osteoarthritis     Seasonal allergies      Past Surgical History:   Procedure Laterality Date    CATARACT EXTRACTION, BILATERAL Bilateral     INGUINAL HERNIA REPAIR      VASECTOMY       Social History   Social History     Substance and Sexual Activity   Alcohol Use No     Social History     Substance and Sexual Activity   Drug Use No     Social History     Tobacco Use   Smoking Status Never Smoker   Smokeless Tobacco Never Used     Family History:   Family History   Problem Relation Age of Onset    Hypertension Mother     Dementia Mother     Diabetes type II Mother     Lung cancer Father     Diabetes type II Father     Diabetes type II Sister     Arthritis Sister     Diabetes type II Brother     Diabetes unspecified Sister         prediabetes    Arthritis Sister     Hypertension Son     Obesity Son        Meds/Allergies   Current Outpatient Medications   Medication Sig Dispense Refill    Ascorbic Acid (VITAMIN C) 1000 MG tablet Take 1,000 mg by mouth daily      aspirin (ASPIRIN 81) 81 mg chewable tablet Chew 81 mg daily      atorvastatin (LIPITOR) 10 mg tablet TAKE 1 TABLET BY MOUTH EVERY DAY 90 tablet 6    Blood Glucose Monitoring Suppl (ONE TOUCH ULTRA 2) w/Device KIT Use once daily 1 each 0    Canagliflozin (Invokana) 300 MG TABS Take 1 tablet (300 mg total) by mouth daily before breakfast 90 tablet 3    glipiZIDE (GLUCOTROL) 5 mg tablet TAKE 3 TABLETS BY MOUTH IN THE MORNING, AND 1 TABLET BY MOUTH IN THE EVENING   360 tablet 3    JANUVIA 100 MG tablet TAKE 1 TABLET BY MOUTH EVERY DAY 90 tablet 3    Lancets (OneTouch Delica Plus [id number]) MISC Use to test blood sugars once a day 100 each 5    lisinopril-hydrochlorothiazide (PRINZIDE,ZESTORETIC) 10-12 5 MG per tablet Take 1 tablet by mouth daily      Loratadine-Pseudoephedrine (CLARITIN-D 12 HOUR PO) Take 1 tablet by mouth daily        metFORMIN (GLUCOPHAGE) 1000 MG tablet Take 1 tablet (1,000 mg total) by mouth 2 (two) times a day with meals 180 tablet 3    Multiple Vitamin (MULTI-VITAMIN DAILY PO) Take by mouth daily        omeprazole (PriLOSEC) 20 mg delayed release capsule Take 20 mg by mouth daily      OneTouch Ultra test strip USE TO TEST BLOOD SUGAR ONCE DAILY 100 each 3    pioglitazone (ACTOS) 15 mg tablet Take 1 tablet (15 mg total) by mouth daily 90 tablet 3     No current facility-administered medications for this visit  Allergies   Allergen Reactions    Iron GI Intolerance     Upsets stomach       Objective   Vitals: Blood pressure 138/72, pulse 82, temperature 98 °F (36 7 °C), height 5' 11" (1 803 m), weight 109 kg (240 lb 6 4 oz)  Invasive Devices     None                 Physical Exam  Vitals signs reviewed  Constitutional:       Appearance: Normal appearance  He is well-developed  HENT:      Head: Normocephalic and atraumatic  Eyes:      Extraocular Movements: Extraocular movements intact  Conjunctiva/sclera: Conjunctivae normal    Neck:      Musculoskeletal: Normal range of motion and neck supple  Thyroid: No thyromegaly  Vascular: No carotid bruit  Comments: Thyroid normal in size  No thyroid nodules  Cardiovascular:      Rate and Rhythm: Normal rate and regular rhythm  Heart sounds: Normal heart sounds  No murmur  Comments: 1+ dorsalis pedis and posterior tibialis pulses bilaterally  Pulmonary:      Breath sounds: Normal breath sounds  No wheezing  Abdominal:      Palpations: Abdomen is soft  Musculoskeletal:         General: No deformity  Right lower leg: No edema  Left lower leg: No edema  Comments: No ulcerations of the feet  Lymphadenopathy:      Cervical: No cervical adenopathy     Skin:     General: Skin is warm and dry       Findings: No erythema or rash  Neurological:      Mental Status: He is alert and oriented to person, place, and time  Deep Tendon Reflexes: Reflexes are normal and symmetric  Comments: Vibration sensation diminished to the 1st toe DIP joint bilaterally  Microfilament sensation intact bilaterally  The history was obtained from the review of the chart and from the patient  Lab Results:    Most recent Alc is  Lab Results   Component Value Date    HGBA1C 8 1 (H) 08/26/2020           CMP done on 08/26/2020 showed a glucose of 150 fasting but was otherwise normal   Lab Results   Component Value Date    CREATININE 1 05 08/26/2020    CREATININE 1 12 05/13/2020    CREATININE 1 03 10/04/2019    BUN 24 08/26/2020    K 4 3 08/26/2020     08/26/2020    CO2 23 08/26/2020   Total cholesterol 180, LDL cholesterol 93  Lab Results   Component Value Date    HDL 61 08/26/2020    TRIG 131 08/26/2020     Lab Results   Component Value Date    ALT 16 08/26/2020    AST 16 08/26/2020       Lab Results   Component Value Date    TSH 1 500 10/09/2018     Urine microalbumin to creatinine ratio was 4       Future Appointments   Date Time Provider Raleigh Loving   12/21/2020  8:00 AM Yumi Munoz MD [facility]

## 2020-09-01 NOTE — PATIENT INSTRUCTIONS
hgba1c is 8 1%  this is better but not at goal    Let's continue the same medications for now and give the pioglitazone more time to see an effect  Check blood sugar 1-2 times a day  Cholesterol, liver, kidney, and thyroid function are normal   Follow up in 3 months with blood work

## 2020-10-15 LAB
LEFT EYE DIABETIC RETINOPATHY: NORMAL
RIGHT EYE DIABETIC RETINOPATHY: NORMAL

## 2020-12-12 LAB
ALBUMIN SERPL-MCNC: 4.8 G/DL (ref 3.8–4.8)
ALBUMIN/GLOB SERPL: 1.8 {RATIO} (ref 1.2–2.2)
ALP SERPL-CCNC: 69 IU/L (ref 39–117)
ALT SERPL-CCNC: 17 IU/L (ref 0–44)
AST SERPL-CCNC: 20 IU/L (ref 0–40)
BILIRUB SERPL-MCNC: 0.3 MG/DL (ref 0–1.2)
BUN SERPL-MCNC: 19 MG/DL (ref 8–27)
BUN/CREAT SERPL: 18 (ref 10–24)
CALCIUM SERPL-MCNC: 9.6 MG/DL (ref 8.6–10.2)
CHLORIDE SERPL-SCNC: 99 MMOL/L (ref 96–106)
CO2 SERPL-SCNC: 24 MMOL/L (ref 20–29)
CREAT SERPL-MCNC: 1.04 MG/DL (ref 0.76–1.27)
EST. AVERAGE GLUCOSE BLD GHB EST-MCNC: 194 MG/DL
GLOBULIN SER-MCNC: 2.6 G/DL (ref 1.5–4.5)
GLUCOSE SERPL-MCNC: 125 MG/DL (ref 65–99)
HBA1C MFR BLD: 8.4 % (ref 4.8–5.6)
POTASSIUM SERPL-SCNC: 4.3 MMOL/L (ref 3.5–5.2)
PROT SERPL-MCNC: 7.4 G/DL (ref 6–8.5)
SL AMB EGFR AFRICAN AMERICAN: 85 ML/MIN/1.73
SL AMB EGFR NON AFRICAN AMERICAN: 73 ML/MIN/1.73
SODIUM SERPL-SCNC: 138 MMOL/L (ref 134–144)

## 2020-12-21 ENCOUNTER — OFFICE VISIT (OUTPATIENT)
Dept: ENDOCRINOLOGY | Facility: HOSPITAL | Age: 70
End: 2020-12-21
Payer: MEDICARE

## 2020-12-21 VITALS
DIASTOLIC BLOOD PRESSURE: 72 MMHG | HEART RATE: 90 BPM | BODY MASS INDEX: 34.05 KG/M2 | SYSTOLIC BLOOD PRESSURE: 130 MMHG | HEIGHT: 71 IN | WEIGHT: 243.2 LBS

## 2020-12-21 DIAGNOSIS — I10 ESSENTIAL HYPERTENSION: ICD-10-CM

## 2020-12-21 DIAGNOSIS — E78.2 MIXED HYPERLIPIDEMIA: ICD-10-CM

## 2020-12-21 DIAGNOSIS — E11.40 TYPE 2 DIABETES MELLITUS WITH DIABETIC NEUROPATHY, WITHOUT LONG-TERM CURRENT USE OF INSULIN (HCC): Primary | ICD-10-CM

## 2020-12-21 PROCEDURE — 99215 OFFICE O/P EST HI 40 MIN: CPT | Performed by: INTERNAL MEDICINE

## 2020-12-21 RX ORDER — CHOLECALCIFEROL (VITAMIN D3) 125 MCG
CAPSULE ORAL DAILY
COMMUNITY

## 2020-12-21 RX ORDER — MELATONIN
1000 DAILY
COMMUNITY
End: 2020-12-21 | Stop reason: CLARIF

## 2021-03-04 DIAGNOSIS — Z23 ENCOUNTER FOR IMMUNIZATION: ICD-10-CM

## 2021-03-09 ENCOUNTER — IMMUNIZATIONS (OUTPATIENT)
Dept: FAMILY MEDICINE CLINIC | Facility: HOSPITAL | Age: 71
End: 2021-03-09

## 2021-03-09 DIAGNOSIS — Z23 ENCOUNTER FOR IMMUNIZATION: Primary | ICD-10-CM

## 2021-03-09 PROCEDURE — 91300 SARS-COV-2 / COVID-19 MRNA VACCINE (PFIZER-BIONTECH) 30 MCG: CPT

## 2021-03-09 PROCEDURE — 0001A SARS-COV-2 / COVID-19 MRNA VACCINE (PFIZER-BIONTECH) 30 MCG: CPT

## 2021-03-11 DIAGNOSIS — E11.8 TYPE 2 DIABETES MELLITUS WITH COMPLICATION, WITHOUT LONG-TERM CURRENT USE OF INSULIN (HCC): ICD-10-CM

## 2021-03-11 DIAGNOSIS — E11.40 TYPE 2 DIABETES MELLITUS WITH DIABETIC NEUROPATHY, WITHOUT LONG-TERM CURRENT USE OF INSULIN (HCC): ICD-10-CM

## 2021-03-11 RX ORDER — PIOGLITAZONEHYDROCHLORIDE 15 MG/1
15 TABLET ORAL DAILY
Qty: 90 TABLET | Refills: 3 | Status: SHIPPED | OUTPATIENT
Start: 2021-03-11 | End: 2021-10-05 | Stop reason: SDUPTHER

## 2021-03-11 RX ORDER — GLIPIZIDE 5 MG/1
TABLET ORAL
Qty: 360 TABLET | Refills: 3 | Status: SHIPPED | OUTPATIENT
Start: 2021-03-11 | End: 2022-01-28

## 2021-03-30 ENCOUNTER — IMMUNIZATIONS (OUTPATIENT)
Dept: FAMILY MEDICINE CLINIC | Facility: HOSPITAL | Age: 71
End: 2021-03-30

## 2021-03-30 DIAGNOSIS — Z23 ENCOUNTER FOR IMMUNIZATION: Primary | ICD-10-CM

## 2021-03-30 PROCEDURE — 91300 SARS-COV-2 / COVID-19 MRNA VACCINE (PFIZER-BIONTECH) 30 MCG: CPT

## 2021-03-30 PROCEDURE — 0002A SARS-COV-2 / COVID-19 MRNA VACCINE (PFIZER-BIONTECH) 30 MCG: CPT

## 2021-04-24 LAB
ALBUMIN SERPL-MCNC: 4.3 G/DL (ref 3.8–4.8)
ALBUMIN/GLOB SERPL: 1.6 {RATIO} (ref 1.2–2.2)
ALP SERPL-CCNC: 65 IU/L (ref 39–117)
ALT SERPL-CCNC: 15 IU/L (ref 0–44)
AST SERPL-CCNC: 14 IU/L (ref 0–40)
BILIRUB SERPL-MCNC: 0.2 MG/DL (ref 0–1.2)
BUN SERPL-MCNC: 20 MG/DL (ref 8–27)
BUN/CREAT SERPL: 21 (ref 10–24)
CALCIUM SERPL-MCNC: 9.4 MG/DL (ref 8.6–10.2)
CHLORIDE SERPL-SCNC: 101 MMOL/L (ref 96–106)
CO2 SERPL-SCNC: 23 MMOL/L (ref 20–29)
CREAT SERPL-MCNC: 0.94 MG/DL (ref 0.76–1.27)
EST. AVERAGE GLUCOSE BLD GHB EST-MCNC: 200 MG/DL
GLOBULIN SER-MCNC: 2.7 G/DL (ref 1.5–4.5)
GLUCOSE SERPL-MCNC: 153 MG/DL (ref 65–99)
HBA1C MFR BLD: 8.6 % (ref 4.8–5.6)
POTASSIUM SERPL-SCNC: 4.3 MMOL/L (ref 3.5–5.2)
PROT SERPL-MCNC: 7 G/DL (ref 6–8.5)
SL AMB EGFR AFRICAN AMERICAN: 96 ML/MIN/1.73
SL AMB EGFR NON AFRICAN AMERICAN: 83 ML/MIN/1.73
SODIUM SERPL-SCNC: 138 MMOL/L (ref 134–144)
TSH SERPL DL<=0.005 MIU/L-ACNC: 1.57 UIU/ML (ref 0.45–4.5)

## 2021-04-28 DIAGNOSIS — E11.8 TYPE 2 DIABETES MELLITUS WITH COMPLICATION, WITHOUT LONG-TERM CURRENT USE OF INSULIN (HCC): ICD-10-CM

## 2021-04-30 ENCOUNTER — OFFICE VISIT (OUTPATIENT)
Dept: ENDOCRINOLOGY | Facility: HOSPITAL | Age: 71
End: 2021-04-30
Payer: MEDICARE

## 2021-04-30 VITALS
DIASTOLIC BLOOD PRESSURE: 80 MMHG | HEIGHT: 71 IN | HEART RATE: 94 BPM | BODY MASS INDEX: 33.85 KG/M2 | WEIGHT: 241.8 LBS | SYSTOLIC BLOOD PRESSURE: 130 MMHG

## 2021-04-30 DIAGNOSIS — E78.2 MIXED HYPERLIPIDEMIA: ICD-10-CM

## 2021-04-30 DIAGNOSIS — E11.65 TYPE 2 DIABETES MELLITUS WITH HYPERGLYCEMIA, WITHOUT LONG-TERM CURRENT USE OF INSULIN (HCC): ICD-10-CM

## 2021-04-30 DIAGNOSIS — I10 ESSENTIAL HYPERTENSION: ICD-10-CM

## 2021-04-30 DIAGNOSIS — E11.40 TYPE 2 DIABETES MELLITUS WITH DIABETIC NEUROPATHY, WITHOUT LONG-TERM CURRENT USE OF INSULIN (HCC): Primary | ICD-10-CM

## 2021-04-30 PROCEDURE — 99215 OFFICE O/P EST HI 40 MIN: CPT | Performed by: INTERNAL MEDICINE

## 2021-04-30 RX ORDER — CANAGLIFLOZIN 300 MG/1
300 TABLET, FILM COATED ORAL
Qty: 90 TABLET | Refills: 3 | Status: SHIPPED | OUTPATIENT
Start: 2021-04-30 | End: 2022-01-26 | Stop reason: SDUPTHER

## 2021-04-30 RX ORDER — BLOOD SUGAR DIAGNOSTIC
STRIP MISCELLANEOUS
Qty: 100 EACH | Refills: 3 | Status: SHIPPED | OUTPATIENT
Start: 2021-04-30 | End: 2021-05-28

## 2021-04-30 RX ORDER — LANCETS 33 GAUGE
EACH MISCELLANEOUS
Qty: 100 EACH | Refills: 5 | Status: SHIPPED | OUTPATIENT
Start: 2021-04-30 | End: 2022-01-26 | Stop reason: SDUPTHER

## 2021-04-30 NOTE — PATIENT INSTRUCTIONS
hgba1c is 8 6%  this is higher  the rest of the blood work is good  continue the same diabetes medicines  Make sure to take the evening pills at supper  Start checking blood sugars at times before or 2 hour after supper/dinner  Work on diet and exercise  Follow up in 3 months with blood work

## 2021-04-30 NOTE — PROGRESS NOTES
4/30/2021    Assessment/Plan      Diagnoses and all orders for this visit:    Type 2 diabetes mellitus with diabetic neuropathy, without long-term current use of insulin (HCC)  -     Lancets (OneTouch Delica Plus [id number]) MISC; Use to test blood sugars twice a day  -     OneTouch Ultra test strip; Use to test blood sugars twice a day  -     HEMOGLOBIN A1C W/ EAG ESTIMATION Lab Collect; Future  -     Comprehensive metabolic panel Lab Collect; Future  -     CBC and differential Lab Collect; Future  -     Lipid Panel with Direct LDL reflex Lab Collect; Future  -     Microalbumin / creatinine urine ratio Lab Collect; Future    Essential hypertension  -     HEMOGLOBIN A1C W/ EAG ESTIMATION Lab Collect; Future  -     Comprehensive metabolic panel Lab Collect; Future  -     CBC and differential Lab Collect; Future  -     Lipid Panel with Direct LDL reflex Lab Collect; Future  -     Microalbumin / creatinine urine ratio Lab Collect; Future    Mixed hyperlipidemia  -     HEMOGLOBIN A1C W/ EAG ESTIMATION Lab Collect; Future  -     Comprehensive metabolic panel Lab Collect; Future  -     CBC and differential Lab Collect; Future  -     Lipid Panel with Direct LDL reflex Lab Collect; Future  -     Microalbumin / creatinine urine ratio Lab Collect; Future    Type 2 diabetes mellitus with hyperglycemia, without long-term current use of insulin (HCC)  -     Canagliflozin (Invokana) 300 MG TABS; Take 1 tablet (300 mg total) by mouth daily before breakfast        Assessment/Plan:    1  Type 2 diabetes  Hemoglobin A1c is 8 6%  This has gone up since last visit  He is on the maximum dose of metformin, glipizide, Invokana, and Januvia  He is on a low-dose of pioglitazone and I could increase that dosage but he has refused to do that    He says he wants to just continue the same medications and I have asked him to start checking his blood sugars before and 2 hours after supper or dinner at times since all he does is check blood sugars in the morning it is unclear what his sugars are doing throughout the day  He is going to work on dietary changes and exercise  He is going to make sure that he takes his evening  Medications at supper and not at bedtime  2  Diabetic neuropathy  He has unchanged neuropathic symptoms  Diabetic foot exams are up-to-date  3  Hypertension  Blood pressure is under good control on his current dose of lisinopril / HCTZ  4  Hyperlipidemia  He will continue the same atorvastatin 10 mg daily  I will repeat a lipid profile with his next visit  I have asked him to follow up in 3 months with preceding hemoglobin A1c, CMP, CBC, lipid panel, and urine microalbumin to creatinine ratio  CC: Diabetes type 2, blood pressure, lipid follow-up    History of Present Illness     HPI: Waldemar Miner is a 78y o  year old male with type 2 diabetes with neuropathy for 9 years, hypertension, hyperlipidemia for follow-up visit  He is on oral agents at home and takes  Metformin 1000 mg twice a day, glipizide 5 mg 3 tablets in the morning and 1 tablet in the evening,  Januvia 100 mg daily, pioglitazone 15 mg daily, and Invokana 300 mg daily  He denies any polyuria, polydipsia, polyphagia, and blurry vision  He has occasional once a night nocturia  He has unchanged numbness and tingling of the feet  He denies chest pain or shortness of breath  He denies nephropathy, retinopathy, heart attack, stroke and claudication but does admit to neuropathy  Hypoglycemic episodes: No rare  The patient's last eye exam was in  October 2020 with no retinopathy  The patient's last foot exam was in  December 2020 at endocrine office visit  He does not see Podiatry  Last A1C was   Lab Results   Component Value Date    HGBA1C 8 6 (H) 04/23/2021     Blood Sugar/Glucometer/Pump/CGM review: checks  Blood sugars 1-2 times a day  Before breakfast: 90-200s    He has hyperlipidemia and takes atorvastatin 10 mg daily  He denies chest pain or shortness of breath  He has hypertension and takes lisinopril/ HCTZ 10/12 5 mg daily  He denies stroke-like symptoms  He denies headaches  Review of Systems   Constitutional: Positive for fatigue  Negative for unexpected weight change  Eating not as good as it could be  Has been less active  will be more active in the summer months  will be able to nap easily when idle  HENT: Negative for trouble swallowing  Eyes: Negative for visual disturbance  Wears glasses  Respiratory: Negative for chest tightness and shortness of breath  Cardiovascular: Negative for chest pain and leg swelling  Gastrointestinal: Positive for constipation  Negative for abdominal pain, diarrhea and nausea  Constipated the last few weeks  Endocrine: Negative for polydipsia, polyphagia and polyuria  Nocturia occasionally once a night  Skin: Positive for rash  Negative for wound  Something under the toenail had rash on arm with liquid soap change and now back to the old soap  Neurological: Positive for numbness  Negative for dizziness, weakness, light-headedness and headaches  Numbness and tingling of the feet unchanged  Psychiatric/Behavioral: Positive for sleep disturbance  Under a lot of stress with wife surgery and MD appointments  Had stent in an artery replaced in 2005  Sleeping not as good as in the past  Uses melatonin at night to sleep         Historical Information   Past Medical History:   Diagnosis Date    Dupuytren's contracture of hand     bilateral    GERD (gastroesophageal reflux disease)     Migraine     Osteoarthritis     Seasonal allergies      Past Surgical History:   Procedure Laterality Date    CATARACT EXTRACTION, BILATERAL Bilateral     INGUINAL HERNIA REPAIR      VASECTOMY       Social History   Social History     Substance and Sexual Activity   Alcohol Use No     Social History     Substance and Sexual Activity Drug Use No     Social History     Tobacco Use   Smoking Status Never Smoker   Smokeless Tobacco Never Used     Family History:   Family History   Problem Relation Age of Onset    Hypertension Mother     Dementia Mother     Diabetes type II Mother     Lung cancer Father     Diabetes type II Father     Diabetes type II Sister     Arthritis Sister     Diabetes type II Brother     Diabetes unspecified Sister         prediabetes    Arthritis Sister     Hypertension Son     Obesity Son        Meds/Allergies   Current Outpatient Medications   Medication Sig Dispense Refill    Ascorbic Acid (VITAMIN C) 1000 MG tablet Take 1,000 mg by mouth daily      aspirin (ASPIRIN 81) 81 mg chewable tablet Chew 81 mg daily      atorvastatin (LIPITOR) 10 mg tablet TAKE 1 TABLET BY MOUTH EVERY DAY 90 tablet 6    Blood Glucose Monitoring Suppl (ONE TOUCH ULTRA 2) w/Device KIT Use once daily 1 each 0    Canagliflozin (Invokana) 300 MG TABS Take 1 tablet (300 mg total) by mouth daily before breakfast 90 tablet 3    Cholecalciferol (Vitamin D) 125 MCG (5000 UT) CAPS Take by mouth daily      glipiZIDE (GLUCOTROL) 5 mg tablet TAKE 3 TABLETS BY MOUTH IN THE MORNING, AND 1 TABLET BY MOUTH IN THE EVENING   360 tablet 3    Lancets (OneTouch Delica Plus [id number]) MISC Use to test blood sugars twice a day 100 each 5    lisinopril-hydrochlorothiazide (PRINZIDE,ZESTORETIC) 10-12 5 MG per tablet Take 1 tablet by mouth daily      Loratadine-Pseudoephedrine (CLARITIN-D 12 HOUR PO) Take 1 tablet by mouth daily        metFORMIN (GLUCOPHAGE) 1000 MG tablet TAKE 1 TABLET BY MOUTH TWICE A DAY WITH MEALS 180 tablet 3    Multiple Vitamin (MULTI-VITAMIN DAILY PO) Take by mouth daily        omeprazole (PriLOSEC) 20 mg delayed release capsule Take 20 mg by mouth daily      OneTouch Ultra test strip Use to test blood sugars twice a day 100 each 3    pioglitazone (ACTOS) 15 mg tablet Take 1 tablet (15 mg total) by mouth daily 90 tablet 3  sitaGLIPtin (Januvia) 100 mg tablet Take 1 tablet (100 mg total) by mouth daily 90 tablet 3     No current facility-administered medications for this visit  Allergies   Allergen Reactions    Iron GI Intolerance     Upsets stomach       Objective   Vitals: Blood pressure 130/80, pulse 94, height 5' 11" (1 803 m), weight 110 kg (241 lb 12 8 oz)  Invasive Devices     None                 Physical Exam  Vitals signs reviewed  Constitutional:       Appearance: Normal appearance  He is well-developed  HENT:      Head: Normocephalic and atraumatic  Eyes:      Conjunctiva/sclera: Conjunctivae normal    Neck:      Musculoskeletal: Normal range of motion and neck supple  Thyroid: No thyromegaly  Vascular: No carotid bruit  Comments: Thyroid normal in size  No palpable thyroid nodules  Cardiovascular:      Rate and Rhythm: Normal rate and regular rhythm  Heart sounds: Normal heart sounds  No murmur  Comments: 1+ Dorsalis pedis and posterior tibialis pulses bilaterally  Pulmonary:      Effort: Pulmonary effort is normal       Breath sounds: Normal breath sounds  No wheezing  Abdominal:      Palpations: Abdomen is soft  Tenderness: There is no abdominal tenderness  Musculoskeletal: Normal range of motion  General: No deformity  Right lower leg: No edema  Left lower leg: No edema  Comments: Corn on lateral 5th toe right  Left 1st toenail with hemorrhage under the nail slowly growing out  No ulcerations of the feet  Lymphadenopathy:      Cervical: No cervical adenopathy  Skin:     General: Skin is warm and dry  Findings: No erythema or rash  Neurological:      Mental Status: He is alert and oriented to person, place, and time  Deep Tendon Reflexes: Reflexes are normal and symmetric  The history was obtained from the review of the chart and from the patient      Lab Results:    Most recent Alc is  Lab Results   Component Value Date HGBA1C 8 6 (H) 04/23/2021             Blood work done on 04/23/2021 showed a CMP with a glucose of 153 fasting but was otherwise normal     Lab Results   Component Value Date    CREATININE 0 94 04/23/2021    CREATININE 1 04 12/11/2020    CREATININE 1 05 08/26/2020    BUN 20 04/23/2021    K 4 3 04/23/2021     04/23/2021    CO2 23 04/23/2021         Lab Results   Component Value Date    HDL 61 08/26/2020    TRIG 131 08/26/2020       Lab Results   Component Value Date    ALT 15 04/23/2021    AST 14 04/23/2021       Lab Results   Component Value Date    TSH 1 570 04/23/2021       Future Appointments   Date Time Provider Raleigh Herrmannisti   8/19/2021  2:40 PM Joe Linares MD ENDO [id number] [facility]

## 2021-05-28 DIAGNOSIS — E11.40 TYPE 2 DIABETES MELLITUS WITH DIABETIC NEUROPATHY, WITHOUT LONG-TERM CURRENT USE OF INSULIN (HCC): ICD-10-CM

## 2021-05-28 RX ORDER — BLOOD SUGAR DIAGNOSTIC
STRIP MISCELLANEOUS
Qty: 100 STRIP | Refills: 3 | Status: SHIPPED | OUTPATIENT
Start: 2021-05-28 | End: 2022-01-26 | Stop reason: SDUPTHER

## 2021-07-19 DIAGNOSIS — E78.2 MIXED HYPERLIPIDEMIA: ICD-10-CM

## 2021-07-19 RX ORDER — ATORVASTATIN CALCIUM 10 MG/1
TABLET, FILM COATED ORAL
Qty: 90 TABLET | Refills: 6 | Status: SHIPPED | OUTPATIENT
Start: 2021-07-19 | End: 2022-07-22

## 2021-08-14 LAB
ALBUMIN SERPL-MCNC: 4.4 G/DL (ref 3.8–4.8)
ALBUMIN/CREAT UR: 4 MG/G CREAT (ref 0–29)
ALBUMIN/GLOB SERPL: 1.8 {RATIO} (ref 1.2–2.2)
ALP SERPL-CCNC: 60 IU/L (ref 48–121)
ALT SERPL-CCNC: 16 IU/L (ref 0–44)
AST SERPL-CCNC: 16 IU/L (ref 0–40)
BASOPHILS # BLD AUTO: 0.1 X10E3/UL (ref 0–0.2)
BASOPHILS NFR BLD AUTO: 1 %
BILIRUB SERPL-MCNC: 0.3 MG/DL (ref 0–1.2)
BUN SERPL-MCNC: 21 MG/DL (ref 8–27)
BUN/CREAT SERPL: 18 (ref 10–24)
CALCIUM SERPL-MCNC: 9.7 MG/DL (ref 8.6–10.2)
CHLORIDE SERPL-SCNC: 100 MMOL/L (ref 96–106)
CHOLEST SERPL-MCNC: 157 MG/DL (ref 100–199)
CO2 SERPL-SCNC: 24 MMOL/L (ref 20–29)
CREAT SERPL-MCNC: 1.19 MG/DL (ref 0.76–1.27)
CREAT UR-MCNC: 103.4 MG/DL
EOSINOPHIL # BLD AUTO: 0.3 X10E3/UL (ref 0–0.4)
EOSINOPHIL NFR BLD AUTO: 4 %
ERYTHROCYTE [DISTWIDTH] IN BLOOD BY AUTOMATED COUNT: 16.9 % (ref 11.6–15.4)
EST. AVERAGE GLUCOSE BLD GHB EST-MCNC: 197 MG/DL
GLOBULIN SER-MCNC: 2.5 G/DL (ref 1.5–4.5)
GLUCOSE SERPL-MCNC: 140 MG/DL (ref 65–99)
HBA1C MFR BLD: 8.5 % (ref 4.8–5.6)
HCT VFR BLD AUTO: 40.4 % (ref 37.5–51)
HDLC SERPL-MCNC: 58 MG/DL
HGB BLD-MCNC: 12.6 G/DL (ref 13–17.7)
IMM GRANULOCYTES # BLD: 0 X10E3/UL (ref 0–0.1)
IMM GRANULOCYTES NFR BLD: 0 %
LDLC SERPL CALC-MCNC: 77 MG/DL (ref 0–99)
LDLC/HDLC SERPL: 1.3 RATIO (ref 0–3.6)
LYMPHOCYTES # BLD AUTO: 2.2 X10E3/UL (ref 0.7–3.1)
LYMPHOCYTES NFR BLD AUTO: 25 %
MCH RBC QN AUTO: 25 PG (ref 26.6–33)
MCHC RBC AUTO-ENTMCNC: 31.2 G/DL (ref 31.5–35.7)
MCV RBC AUTO: 80 FL (ref 79–97)
MICROALBUMIN UR-MCNC: 4.4 UG/ML
MONOCYTES # BLD AUTO: 0.8 X10E3/UL (ref 0.1–0.9)
MONOCYTES NFR BLD AUTO: 9 %
NEUTROPHILS # BLD AUTO: 5.4 X10E3/UL (ref 1.4–7)
NEUTROPHILS NFR BLD AUTO: 61 %
PLATELET # BLD AUTO: 324 X10E3/UL (ref 150–450)
POTASSIUM SERPL-SCNC: 4.1 MMOL/L (ref 3.5–5.2)
PROT SERPL-MCNC: 6.9 G/DL (ref 6–8.5)
RBC # BLD AUTO: 5.04 X10E6/UL (ref 4.14–5.8)
SL AMB EGFR AFRICAN AMERICAN: 72 ML/MIN/1.73
SL AMB EGFR NON AFRICAN AMERICAN: 62 ML/MIN/1.73
SL AMB VLDL CHOLESTEROL CALC: 22 MG/DL (ref 5–40)
SODIUM SERPL-SCNC: 139 MMOL/L (ref 134–144)
TRIGL SERPL-MCNC: 124 MG/DL (ref 0–149)
WBC # BLD AUTO: 8.8 X10E3/UL (ref 3.4–10.8)

## 2021-08-19 ENCOUNTER — OFFICE VISIT (OUTPATIENT)
Dept: ENDOCRINOLOGY | Facility: HOSPITAL | Age: 71
End: 2021-08-19
Payer: MEDICARE

## 2021-08-19 VITALS
WEIGHT: 242 LBS | HEART RATE: 86 BPM | DIASTOLIC BLOOD PRESSURE: 70 MMHG | BODY MASS INDEX: 33.88 KG/M2 | SYSTOLIC BLOOD PRESSURE: 124 MMHG | HEIGHT: 71 IN

## 2021-08-19 DIAGNOSIS — I10 ESSENTIAL HYPERTENSION: ICD-10-CM

## 2021-08-19 DIAGNOSIS — E78.2 MIXED HYPERLIPIDEMIA: ICD-10-CM

## 2021-08-19 DIAGNOSIS — E11.40 TYPE 2 DIABETES MELLITUS WITH DIABETIC NEUROPATHY, WITHOUT LONG-TERM CURRENT USE OF INSULIN (HCC): Primary | ICD-10-CM

## 2021-08-19 PROCEDURE — 99215 OFFICE O/P EST HI 40 MIN: CPT | Performed by: INTERNAL MEDICINE

## 2021-08-19 NOTE — PATIENT INSTRUCTIONS
hgba1c is 8 5%  this is not really better  Work on diet and exercise  Continue the same glipizide, metformin, invokana, januvia, and pioglitazone  Continue to test blood sugars at least once daily  The rest of the blood work is good  There is some anemia due to iron deficiency, see Dr Eder Soto for this  Follow up in 3 months with blood work

## 2021-08-19 NOTE — PROGRESS NOTES
8/22/2021    Assessment/Plan      Diagnoses and all orders for this visit:    Type 2 diabetes mellitus with diabetic neuropathy, without long-term current use of insulin ([facility] [address] )  -     HEMOGLOBIN A1C W/ EAG ESTIMATION Lab Collect; Future  -     Comprehensive metabolic panel Lab Collect; Future    Essential hypertension  -     HEMOGLOBIN A1C W/ EAG ESTIMATION Lab Collect; Future  -     Comprehensive metabolic panel Lab Collect; Future    Mixed hyperlipidemia  -     HEMOGLOBIN A1C W/ EAG ESTIMATION Lab Collect; Future  -     Comprehensive metabolic panel Lab Collect; Future        Assessment/Plan:    1  Type 2 diabetes  Hemoglobin A1c is 8 5%  This is not improved  He needs to work on a better diet and exercise  He is resistant to making any changes although he could increase his pioglitazone  For now, he will continue the same glipizide, metformin, Invokana, Januvia, and pioglitazone  He will continue to test his blood sugars at least once a day  He has some mild anemia which I have asked him to follow up with his primary care physician for  2  Diabetic neuropathy  He has unchanged neuropathic symptoms  Diabetic foot exams are up-to-date  3  Hypertension  Blood pressure is under good control on his current dose of lisinopril /HCTZ  4  Hyperlipidemia  Lipid profile is excellent  He will continue the same atorvastatin 10 mg daily  I have asked him to follow up in 3 months with preceding hemoglobin A1c and CMP  CC: Diabetes Type 2, blood pressure, lipid follow-up    History of Present Illness     HPI: Riana Nur is a 78y o  year old male with type 2 diabetes with neuropathy for 10 years, hypertension, hyperlipidemia follow-up visit  He is on oral agents at home and takes   Invokana 300 mg daily, glipizide 5 mg 3 tablets in the morning and 1 tablet in the evening, metformin 1000 mg twice a day, pioglitazone 15 mg daily, and Januvia 100 mg daily   He denies any polyuria, polyphagia, and blurry vision  He has some thirst more so than the past occasional once a night nocturia  He has unchanged numbness and tingling of the feet  He denies chest pain but has shortness of breath when he exerts himself a lot  He denies nephropathy, retinopathy, heart attack, stroke and claudication but does admit to neuropathy  Hypoglycemic episodes: No never  The patient's last eye exam was in  October 2020 with no retinopathy  The patient's last foot exam was in  December 2020 at endocrine office visit  He does not see a podiatrist  Last A1C was   Lab Results   Component Value Date    HGBA1C 8 5 (H) 08/13/2021     Blood Sugar/Glucometer/Pump/CGM review: checks blood sugar once daily in am  Mostly 106-169  Had a blood sugar over 300 in am once and down to normal on recheck immediately  He has hyperlipidemia and takes atorvastatin 10 mg daily  He has some shortness of breath when he exerts himself a lot but no chest pain  He has hypertension and takes lisinopril /HCTZ 10/12 5 mg daily  He denies headache or stroke-like symptoms  Review of Systems   Constitutional: Negative for fatigue and unexpected weight change  Will easily fall asleep for 15-20 min nap if idle  HENT: Negative for trouble swallowing  Eyes: Negative for visual disturbance  Wears glasses  Respiratory: Positive for shortness of breath  Negative for chest tightness  Some SOB with exert self a lot  Cardiovascular: Negative for chest pain  Gastrointestinal: Positive for constipation  Negative for abdominal pain, diarrhea and nausea  Occasional constipation  Endocrine: Positive for polydipsia  Negative for polyphagia and polyuria  Nocturia occasionally once a night  Some more thirst than in the past    Skin: Negative for wound  Neurological: Positive for numbness  Negative for dizziness, weakness, light-headedness and headaches          Numbness or tingling of the feet unchanged  Psychiatric/Behavioral: Positive for sleep disturbance  Variable sleep, tylenol PM as needed  Historical Information   Past Medical History:   Diagnosis Date    Dupuytren's contracture of hand     bilateral    GERD (gastroesophageal reflux disease)     Migraine     Osteoarthritis     Seasonal allergies      Past Surgical History:   Procedure Laterality Date    CATARACT EXTRACTION, BILATERAL Bilateral     INGUINAL HERNIA REPAIR      VASECTOMY       Social History   Social History     Substance and Sexual Activity   Alcohol Use No     Social History     Substance and Sexual Activity   Drug Use No     Social History     Tobacco Use   Smoking Status Never Smoker   Smokeless Tobacco Never Used     Family History:   Family History   Problem Relation Age of Onset    Hypertension Mother     Dementia Mother     Diabetes type II Mother     Lung cancer Father     Diabetes type II Father     Diabetes type II Sister     Arthritis Sister     Diabetes type II Brother     Diabetes unspecified Sister         prediabetes    Arthritis Sister     Hypertension Son     Obesity Son        Meds/Allergies   Current Outpatient Medications   Medication Sig Dispense Refill    Ascorbic Acid (VITAMIN C) 1000 MG tablet Take 1,000 mg by mouth daily      aspirin (ASPIRIN 81) 81 mg chewable tablet Chew 81 mg daily      atorvastatin (LIPITOR) 10 mg tablet TAKE 1 TABLET BY MOUTH EVERY DAY 90 tablet 6    Blood Glucose Monitoring Suppl (ONE TOUCH ULTRA 2) w/Device KIT Use once daily 1 each 0    Canagliflozin (Invokana) 300 MG TABS Take 1 tablet (300 mg total) by mouth daily before breakfast 90 tablet 3    Cholecalciferol (Vitamin D) 125 MCG (5000 UT) CAPS Take by mouth daily      glipiZIDE (GLUCOTROL) 5 mg tablet TAKE 3 TABLETS BY MOUTH IN THE MORNING, AND 1 TABLET BY MOUTH IN THE EVENING   360 tablet 3    glucose blood (OneTouch Ultra) test strip TEST ONCE A  strip 3    Lancets (OneTouch Delica Plus [id number]) MISC Use to test blood sugars twice a day 100 each 5    lisinopril-hydrochlorothiazide (PRINZIDE,ZESTORETIC) 10-12 5 MG per tablet Take 1 tablet by mouth daily      Loratadine-Pseudoephedrine (CLARITIN-D 12 HOUR PO) Take 1 tablet by mouth daily        metFORMIN (GLUCOPHAGE) 1000 MG tablet TAKE 1 TABLET BY MOUTH TWICE A DAY WITH MEALS 180 tablet 3    Multiple Vitamin (MULTI-VITAMIN DAILY PO) Take by mouth daily        omeprazole (PriLOSEC) 20 mg delayed release capsule Take 20 mg by mouth daily      pioglitazone (ACTOS) 15 mg tablet Take 1 tablet (15 mg total) by mouth daily 90 tablet 3    sitaGLIPtin (Januvia) 100 mg tablet Take 1 tablet (100 mg total) by mouth daily 90 tablet 3     No current facility-administered medications for this visit  Allergies   Allergen Reactions    Iron GI Intolerance     Upsets stomach       Objective   Vitals: Blood pressure 124/70, pulse 86, height 5' 11" (1 803 m), weight 110 kg (242 lb)  Invasive Devices     None                 Physical Exam  Vitals reviewed  Constitutional:       Appearance: Normal appearance  He is well-developed  HENT:      Head: Normocephalic and atraumatic  Eyes:      Conjunctiva/sclera: Conjunctivae normal    Neck:      Thyroid: No thyromegaly  Vascular: No carotid bruit  Comments: Thyroid normal in size  Cardiovascular:      Rate and Rhythm: Normal rate and regular rhythm  Heart sounds: Normal heart sounds  No murmur heard  Pulmonary:      Effort: Pulmonary effort is normal       Breath sounds: Normal breath sounds  No wheezing  Abdominal:      Palpations: Abdomen is soft  Musculoskeletal:         General: No deformity  Normal range of motion  Cervical back: Normal range of motion and neck supple  Right lower leg: No edema  Left lower leg: No edema  Lymphadenopathy:      Cervical: No cervical adenopathy  Skin:     General: Skin is warm and dry        Findings: No erythema or rash    Neurological:      Mental Status: He is alert and oriented to person, place, and time  Deep Tendon Reflexes: Reflexes are normal and symmetric  The history was obtained from the review of the chart and from the patient  Lab Results:    Most recent Alc is  Lab Results   Component Value Date    HGBA1C 8 5 (H) 08/13/2021             Blood work done on 08/13/2021 showed a CMP with a glucose of 140 fasting but was otherwise normal     Lab Results   Component Value Date    CREATININE 1 19 08/13/2021    CREATININE 0 94 04/23/2021    CREATININE 1 04 12/11/2020    BUN 21 08/13/2021    K 4 1 08/13/2021     08/13/2021    CO2 24 08/13/2021       Total cholesterol 157, LDL cholesterol 77  Lab Results   Component Value Date    HDL 58 08/13/2021    TRIG 124 08/13/2021       Lab Results   Component Value Date    ALT 16 08/13/2021    AST 16 08/13/2021     Lab Results   Component Value Date    TSH 1 570 04/23/2021      urine microalbumin to creatinine ratio is 4  CBC showed hemoglobin of 12 6 with an MCV of 25, MCHC 31 2      Future Appointments   Date Time Provider [place] Loving   11/22/2021  9:40 AM Thony Yun MD [facility]

## 2021-10-05 DIAGNOSIS — E11.40 TYPE 2 DIABETES MELLITUS WITH DIABETIC NEUROPATHY, WITHOUT LONG-TERM CURRENT USE OF INSULIN (HCC): ICD-10-CM

## 2021-10-05 RX ORDER — PIOGLITAZONEHYDROCHLORIDE 15 MG/1
15 TABLET ORAL DAILY
Qty: 90 TABLET | Refills: 3 | Status: SHIPPED | OUTPATIENT
Start: 2021-10-05 | End: 2022-01-12 | Stop reason: SDUPTHER

## 2021-10-14 ENCOUNTER — IMMUNIZATIONS (OUTPATIENT)
Dept: FAMILY MEDICINE CLINIC | Facility: HOSPITAL | Age: 71
End: 2021-10-14

## 2021-10-14 DIAGNOSIS — Z23 ENCOUNTER FOR IMMUNIZATION: Primary | ICD-10-CM

## 2021-10-14 PROCEDURE — 0001A SARS-COV-2 / COVID-19 MRNA VACCINE (PFIZER-BIONTECH) 30 MCG: CPT

## 2021-10-14 PROCEDURE — 91300 SARS-COV-2 / COVID-19 MRNA VACCINE (PFIZER-BIONTECH) 30 MCG: CPT

## 2021-12-04 LAB
ALBUMIN SERPL-MCNC: 4.3 G/DL (ref 3.8–4.8)
ALBUMIN/GLOB SERPL: 1.7 {RATIO} (ref 1.2–2.2)
ALP SERPL-CCNC: 68 IU/L (ref 44–121)
ALT SERPL-CCNC: 20 IU/L (ref 0–44)
AST SERPL-CCNC: 19 IU/L (ref 0–40)
BILIRUB SERPL-MCNC: 0.3 MG/DL (ref 0–1.2)
BUN SERPL-MCNC: 18 MG/DL (ref 8–27)
BUN/CREAT SERPL: 16 (ref 10–24)
CALCIUM SERPL-MCNC: 9.2 MG/DL (ref 8.6–10.2)
CHLORIDE SERPL-SCNC: 103 MMOL/L (ref 96–106)
CO2 SERPL-SCNC: 21 MMOL/L (ref 20–29)
CREAT SERPL-MCNC: 1.11 MG/DL (ref 0.76–1.27)
EST. AVERAGE GLUCOSE BLD GHB EST-MCNC: 206 MG/DL
GLOBULIN SER-MCNC: 2.6 G/DL (ref 1.5–4.5)
GLUCOSE SERPL-MCNC: 148 MG/DL (ref 65–99)
HBA1C MFR BLD: 8.8 % (ref 4.8–5.6)
POTASSIUM SERPL-SCNC: 4.1 MMOL/L (ref 3.5–5.2)
PROT SERPL-MCNC: 6.9 G/DL (ref 6–8.5)
SL AMB EGFR AFRICAN AMERICAN: 78 ML/MIN/1.73
SL AMB EGFR NON AFRICAN AMERICAN: 67 ML/MIN/1.73
SODIUM SERPL-SCNC: 138 MMOL/L (ref 134–144)

## 2021-12-20 ENCOUNTER — OFFICE VISIT (OUTPATIENT)
Dept: ENDOCRINOLOGY | Facility: HOSPITAL | Age: 71
End: 2021-12-20
Payer: MEDICARE

## 2021-12-20 VITALS
HEIGHT: 71 IN | WEIGHT: 242.6 LBS | BODY MASS INDEX: 33.96 KG/M2 | HEART RATE: 87 BPM | SYSTOLIC BLOOD PRESSURE: 132 MMHG | DIASTOLIC BLOOD PRESSURE: 68 MMHG

## 2021-12-20 DIAGNOSIS — Z12.5 ENCOUNTER FOR SCREENING FOR MALIGNANT NEOPLASM OF PROSTATE: ICD-10-CM

## 2021-12-20 DIAGNOSIS — E78.2 MIXED HYPERLIPIDEMIA: ICD-10-CM

## 2021-12-20 DIAGNOSIS — I10 PRIMARY HYPERTENSION: ICD-10-CM

## 2021-12-20 DIAGNOSIS — E11.40 TYPE 2 DIABETES MELLITUS WITH DIABETIC NEUROPATHY, WITHOUT LONG-TERM CURRENT USE OF INSULIN (HCC): Primary | ICD-10-CM

## 2021-12-20 PROCEDURE — 99215 OFFICE O/P EST HI 40 MIN: CPT | Performed by: INTERNAL MEDICINE

## 2022-01-12 ENCOUNTER — OFFICE VISIT (OUTPATIENT)
Dept: DIABETES SERVICES | Facility: HOSPITAL | Age: 72
End: 2022-01-12
Payer: MEDICARE

## 2022-01-12 ENCOUNTER — TELEPHONE (OUTPATIENT)
Dept: ENDOCRINOLOGY | Facility: HOSPITAL | Age: 72
End: 2022-01-12

## 2022-01-12 DIAGNOSIS — E11.40 TYPE 2 DIABETES MELLITUS WITH DIABETIC NEUROPATHY, WITHOUT LONG-TERM CURRENT USE OF INSULIN (HCC): ICD-10-CM

## 2022-01-12 PROCEDURE — 95250 CONT GLUC MNTR PHYS/QHP EQP: CPT | Performed by: DIETITIAN, REGISTERED

## 2022-01-12 RX ORDER — PIOGLITAZONEHYDROCHLORIDE 15 MG/1
15 TABLET ORAL DAILY
Qty: 90 TABLET | Refills: 3 | Status: SHIPPED | OUTPATIENT
Start: 2022-01-12

## 2022-01-12 NOTE — TELEPHONE ENCOUNTER
Patient stopped at  bc he needs his actos refilled to the mail order [address] [facility]

## 2022-01-12 NOTE — PROGRESS NOTES
Dexcom G6 Professional Training    Met with Alyssa Garcia for [employer]  Training today included:    - Explained procedure to [employer]  - Checked sensor expiration date; Sensor lot number: [id number], Transmitter Id: [id number]  - Patient has a blood glucose meter and strips  - Transmitter has been cleaned with alcohol and dried  - Discussed site selection; identified insertion site: right abdomen  - Cleansed the skin with alcohol  - Inserted sensor per instructions: insert sensor,remove insertion tool, smooth tape on the skin  - Snapped in grey transmitter  - Verified transmitter fully snapped in on both sides (2clicks)  - Disposed insertion tool in sharps container  - Verified communication with office reader    Pt is using the dexcom blinded, we tried to use the apps on his phone but had issues  I asked him to contact our local  for help to see what he is doing wrong  If not, will continue to run it blinded  Connected in office, patient left in warmup mode  Patient instructions reviewed with patient:    - Sensor is waterproof for showering and swimming, remove for hot tub, MRI, Xray  - Remove if redness, bleeding, swelling, discomfort at site  - Removal is in 10days with return instructions, you may leave it on your body or remove it just like a bandaid  Place in ziplock back and bring back to the office  - Return for follow up with me in 2 weeks for download review  Sensor inserted by: Saurabh Mason RD CDE    [employer] will return in 2 weeks for sensor removal and data download        Saurabh Mason, [address] [place] [address] 20  [address] [facility] [id number]

## 2022-01-26 ENCOUNTER — OFFICE VISIT (OUTPATIENT)
Dept: DIABETES SERVICES | Facility: HOSPITAL | Age: 72
End: 2022-01-26
Payer: MEDICARE

## 2022-01-26 ENCOUNTER — TELEPHONE (OUTPATIENT)
Dept: ENDOCRINOLOGY | Facility: HOSPITAL | Age: 72
End: 2022-01-26

## 2022-01-26 VITALS — WEIGHT: 242 LBS | HEIGHT: 71 IN | BODY MASS INDEX: 33.88 KG/M2

## 2022-01-26 DIAGNOSIS — E11.40 TYPE 2 DIABETES MELLITUS WITH DIABETIC NEUROPATHY, WITHOUT LONG-TERM CURRENT USE OF INSULIN (HCC): Primary | ICD-10-CM

## 2022-01-26 DIAGNOSIS — E11.65 TYPE 2 DIABETES MELLITUS WITH HYPERGLYCEMIA, WITHOUT LONG-TERM CURRENT USE OF INSULIN (HCC): ICD-10-CM

## 2022-01-26 DIAGNOSIS — E11.40 TYPE 2 DIABETES MELLITUS WITH DIABETIC NEUROPATHY, WITHOUT LONG-TERM CURRENT USE OF INSULIN (HCC): ICD-10-CM

## 2022-01-26 PROCEDURE — 95251 CONT GLUC MNTR ANALYSIS I&R: CPT | Performed by: DIETITIAN, REGISTERED

## 2022-01-26 RX ORDER — INSULIN GLARGINE 100 [IU]/ML
INJECTION, SOLUTION SUBCUTANEOUS
Qty: 15 ML | Refills: 5 | Status: SHIPPED | OUTPATIENT
Start: 2022-01-26 | End: 2022-03-29 | Stop reason: SDUPTHER

## 2022-01-26 RX ORDER — INSULIN GLARGINE 100 [IU]/ML
INJECTION, SOLUTION SUBCUTANEOUS
Qty: 15 ML | Refills: 5
Start: 2022-01-26 | End: 2022-01-26 | Stop reason: SDUPTHER

## 2022-01-26 RX ORDER — BLOOD SUGAR DIAGNOSTIC
STRIP MISCELLANEOUS
Qty: 100 STRIP | Refills: 0 | Status: SHIPPED | OUTPATIENT
Start: 2022-01-26 | End: 2022-01-27 | Stop reason: SDUPTHER

## 2022-01-26 RX ORDER — PEN NEEDLE, DIABETIC 32GX 5/32"
NEEDLE, DISPOSABLE MISCELLANEOUS
Qty: 100 EACH | Refills: 5
Start: 2022-01-26 | End: 2022-01-27 | Stop reason: SDUPTHER

## 2022-01-26 RX ORDER — CANAGLIFLOZIN 300 MG/1
300 TABLET, FILM COATED ORAL
Qty: 90 TABLET | Refills: 0 | Status: SHIPPED | OUTPATIENT
Start: 2022-01-26 | End: 2022-01-27 | Stop reason: SDUPTHER

## 2022-01-26 RX ORDER — LANCETS 33 GAUGE
EACH MISCELLANEOUS
Qty: 100 EACH | Refills: 0 | Status: SHIPPED | OUTPATIENT
Start: 2022-01-26 | End: 2022-01-27 | Stop reason: SDUPTHER

## 2022-01-26 NOTE — PATIENT INSTRUCTIONS
Long acting insulin:  Deric Dorantes     Once a day insulin     Endo office will call with changes to your meds based on Dexcom review    If interested in a personal dexcom, let us know and we can order it for you

## 2022-01-26 NOTE — PROGRESS NOTES
Dexcom Professional G6 Removal     Observations:  Met with amrita for DEX com professional removal   Patient of Dr Maris Hancock  Download shows in goal range 21%, high 63%, very high 16%, 0% low blood sugar  Blood sugars just consistently elevated in climbing throughout the day  We had discussed that the Dexcom on the likely need for additional diabetes medication, based on the fact that he has had diabetes 15 years and is on 5 oral medications and A1c has been elevated for a while  We did discuss the possible need for once a day basal insulin, while he seems like he would like to avoid that if possible he does recognize that it is the inevitable next steps  I completed insulin training while he is here so he does not need to return, he did fine with demonstrating an insulin pen  I gave him the list of basal insulin options so he can look into them with his insurance coverage  He did ask about any pills being removed with the start of basal insulin, discussed glipizide would likely go away, possibly some others  He states the [place] are the expensive medications and if 1 of those could be removed with the addition of the cost of insulin that would be great  Download given to Dr Haydee Michael to review as Dr Hawa Lee is out of the office this week      Download   - Downloaded via [facility] to provider for review    Lab Results   Component Value Date    HGBA1C 8 8 (H) 12/03/2021       Lab Results   Component Value Date    SODIUM 138 12/03/2021    K 4 1 12/03/2021     12/03/2021    CO2 21 12/03/2021    BUN 18 12/03/2021    CREATININE 1 11 12/03/2021    GLUC 148 (H) 12/03/2021    AST 19 12/03/2021    ALT 20 12/03/2021    TP 6 9 12/03/2021    TBILI 0 3 12/03/2021           Sensor Removal By: Benjamin CHICAS  Download Performed by: Benjamin CHICAS

## 2022-01-26 NOTE — TELEPHONE ENCOUNTER
Reviewed recent dex com download showing 100% days with use, average glucose of 216, standard deviation of 39, coefficient variation of 18%, 21% values within range, 0% low in the remainder above goal   Blood sugars are most reasonable in the early morning but increase and remain elevated throughout the day and night  Since the patient is on multiple till medications an average sugars still to 16 I would recommend starting a basal insulin such as Lantus 10 units q h s  and monitor blood sugars and send in glucose logs in 1-2 weeks for review and call sooner with any hypoglycemia of less than 70  I would continue the rest of his regimen as is

## 2022-01-27 DIAGNOSIS — E11.40 TYPE 2 DIABETES MELLITUS WITH DIABETIC NEUROPATHY, WITHOUT LONG-TERM CURRENT USE OF INSULIN (HCC): Primary | ICD-10-CM

## 2022-01-27 DIAGNOSIS — E11.40 TYPE 2 DIABETES MELLITUS WITH DIABETIC NEUROPATHY, WITHOUT LONG-TERM CURRENT USE OF INSULIN (HCC): ICD-10-CM

## 2022-01-27 DIAGNOSIS — E11.8 TYPE 2 DIABETES MELLITUS WITH COMPLICATION, WITHOUT LONG-TERM CURRENT USE OF INSULIN (HCC): ICD-10-CM

## 2022-01-27 DIAGNOSIS — E11.65 TYPE 2 DIABETES MELLITUS WITH HYPERGLYCEMIA, WITHOUT LONG-TERM CURRENT USE OF INSULIN (HCC): ICD-10-CM

## 2022-01-27 RX ORDER — BLOOD SUGAR DIAGNOSTIC
STRIP MISCELLANEOUS
Qty: 300 STRIP | Refills: 3 | Status: SHIPPED | OUTPATIENT
Start: 2022-01-27 | End: 2022-01-31

## 2022-01-27 RX ORDER — CANAGLIFLOZIN 300 MG/1
300 TABLET, FILM COATED ORAL
Qty: 90 TABLET | Refills: 0 | Status: SHIPPED | OUTPATIENT
Start: 2022-01-27 | End: 2022-03-29 | Stop reason: SDUPTHER

## 2022-01-27 RX ORDER — LANCETS 33 GAUGE
EACH MISCELLANEOUS
Qty: 300 EACH | Refills: 3 | Status: SHIPPED | OUTPATIENT
Start: 2022-01-27 | End: 2022-06-28 | Stop reason: SDUPTHER

## 2022-01-27 RX ORDER — PEN NEEDLE, DIABETIC 32GX 5/32"
NEEDLE, DISPOSABLE MISCELLANEOUS
Qty: 100 EACH | Refills: 3 | Status: SHIPPED | OUTPATIENT
Start: 2022-01-27 | End: 2022-03-29 | Stop reason: SDUPTHER

## 2022-01-27 NOTE — TELEPHONE ENCOUNTER
Patient needs pen needs, test strips, and lancets to the local [facility]  Those either got sent to the wrong place or not sent at all

## 2022-01-28 RX ORDER — GLIPIZIDE 5 MG/1
TABLET ORAL
Qty: 360 TABLET | Refills: 3 | Status: SHIPPED | OUTPATIENT
Start: 2022-01-28

## 2022-01-31 ENCOUNTER — PATIENT MESSAGE (OUTPATIENT)
Dept: ENDOCRINOLOGY | Facility: HOSPITAL | Age: 72
End: 2022-01-31

## 2022-01-31 DIAGNOSIS — E11.40 TYPE 2 DIABETES MELLITUS WITH DIABETIC NEUROPATHY, WITHOUT LONG-TERM CURRENT USE OF INSULIN (HCC): ICD-10-CM

## 2022-01-31 RX ORDER — BLOOD SUGAR DIAGNOSTIC
STRIP MISCELLANEOUS
Qty: 300 STRIP | Refills: 3 | Status: SHIPPED | OUTPATIENT
Start: 2022-01-31 | End: 2022-02-01

## 2022-02-24 DIAGNOSIS — E11.8 TYPE 2 DIABETES MELLITUS WITH COMPLICATION, WITHOUT LONG-TERM CURRENT USE OF INSULIN (HCC): ICD-10-CM

## 2022-03-25 LAB
ALBUMIN SERPL-MCNC: 4.4 G/DL (ref 3.8–4.8)
ALBUMIN/CREAT UR: <3 MG/G CREAT (ref 0–29)
ALBUMIN/GLOB SERPL: 1.6 {RATIO} (ref 1.2–2.2)
ALP SERPL-CCNC: 72 IU/L (ref 44–121)
ALT SERPL-CCNC: 19 IU/L (ref 0–44)
AST SERPL-CCNC: 17 IU/L (ref 0–40)
BASOPHILS # BLD AUTO: 0.1 X10E3/UL (ref 0–0.2)
BASOPHILS NFR BLD AUTO: 1 %
BILIRUB SERPL-MCNC: 0.3 MG/DL (ref 0–1.2)
BUN SERPL-MCNC: 19 MG/DL (ref 8–27)
BUN/CREAT SERPL: 18 (ref 10–24)
CALCIUM SERPL-MCNC: 9.6 MG/DL (ref 8.6–10.2)
CHLORIDE SERPL-SCNC: 99 MMOL/L (ref 96–106)
CHOLEST SERPL-MCNC: 158 MG/DL (ref 100–199)
CO2 SERPL-SCNC: 21 MMOL/L (ref 20–29)
CREAT SERPL-MCNC: 1.05 MG/DL (ref 0.76–1.27)
CREAT UR-MCNC: 90.4 MG/DL
EGFR: 77 ML/MIN/1.73
EOSINOPHIL # BLD AUTO: 0.3 X10E3/UL (ref 0–0.4)
EOSINOPHIL NFR BLD AUTO: 3 %
ERYTHROCYTE [DISTWIDTH] IN BLOOD BY AUTOMATED COUNT: 15.9 % (ref 11.6–15.4)
EST. AVERAGE GLUCOSE BLD GHB EST-MCNC: 209 MG/DL
GLOBULIN SER-MCNC: 2.7 G/DL (ref 1.5–4.5)
GLUCOSE SERPL-MCNC: 116 MG/DL (ref 65–99)
HBA1C MFR BLD: 8.9 % (ref 4.8–5.6)
HCT VFR BLD AUTO: 42.5 % (ref 37.5–51)
HDLC SERPL-MCNC: 57 MG/DL
HGB BLD-MCNC: 13.3 G/DL (ref 13–17.7)
IMM GRANULOCYTES # BLD: 0 X10E3/UL (ref 0–0.1)
IMM GRANULOCYTES NFR BLD: 0 %
LDLC SERPL CALC-MCNC: 79 MG/DL (ref 0–99)
LDLC/HDLC SERPL: 1.4 RATIO (ref 0–3.6)
LYMPHOCYTES # BLD AUTO: 2.3 X10E3/UL (ref 0.7–3.1)
LYMPHOCYTES NFR BLD AUTO: 27 %
MCH RBC QN AUTO: 24.8 PG (ref 26.6–33)
MCHC RBC AUTO-ENTMCNC: 31.3 G/DL (ref 31.5–35.7)
MCV RBC AUTO: 79 FL (ref 79–97)
MICROALBUMIN UR-MCNC: <3 UG/ML
MONOCYTES # BLD AUTO: 0.8 X10E3/UL (ref 0.1–0.9)
MONOCYTES NFR BLD AUTO: 9 %
NEUTROPHILS # BLD AUTO: 5.1 X10E3/UL (ref 1.4–7)
NEUTROPHILS NFR BLD AUTO: 60 %
PLATELET # BLD AUTO: 315 X10E3/UL (ref 150–450)
POTASSIUM SERPL-SCNC: 4 MMOL/L (ref 3.5–5.2)
PROT SERPL-MCNC: 7.1 G/DL (ref 6–8.5)
PSA SERPL-MCNC: 1.3 NG/ML (ref 0–4)
RBC # BLD AUTO: 5.37 X10E6/UL (ref 4.14–5.8)
SL AMB VLDL CHOLESTEROL CALC: 22 MG/DL (ref 5–40)
SODIUM SERPL-SCNC: 137 MMOL/L (ref 134–144)
TRIGL SERPL-MCNC: 123 MG/DL (ref 0–149)
TSH SERPL DL<=0.005 MIU/L-ACNC: 1.51 UIU/ML (ref 0.45–4.5)
WBC # BLD AUTO: 8.5 X10E3/UL (ref 3.4–10.8)

## 2022-03-29 ENCOUNTER — OFFICE VISIT (OUTPATIENT)
Dept: ENDOCRINOLOGY | Facility: HOSPITAL | Age: 72
End: 2022-03-29
Payer: MEDICARE

## 2022-03-29 VITALS
WEIGHT: 248.8 LBS | DIASTOLIC BLOOD PRESSURE: 80 MMHG | SYSTOLIC BLOOD PRESSURE: 142 MMHG | HEIGHT: 71 IN | OXYGEN SATURATION: 94 % | HEART RATE: 78 BPM | BODY MASS INDEX: 34.83 KG/M2

## 2022-03-29 DIAGNOSIS — I10 PRIMARY HYPERTENSION: ICD-10-CM

## 2022-03-29 DIAGNOSIS — E11.8 TYPE 2 DIABETES MELLITUS WITH COMPLICATION, WITHOUT LONG-TERM CURRENT USE OF INSULIN (HCC): ICD-10-CM

## 2022-03-29 DIAGNOSIS — E11.65 TYPE 2 DIABETES MELLITUS WITH HYPERGLYCEMIA, WITHOUT LONG-TERM CURRENT USE OF INSULIN (HCC): ICD-10-CM

## 2022-03-29 DIAGNOSIS — E11.40 TYPE 2 DIABETES MELLITUS WITH DIABETIC NEUROPATHY, WITHOUT LONG-TERM CURRENT USE OF INSULIN (HCC): ICD-10-CM

## 2022-03-29 DIAGNOSIS — Z79.4 TYPE 2 DIABETES MELLITUS WITH DIABETIC NEUROPATHY, WITH LONG-TERM CURRENT USE OF INSULIN (HCC): Primary | ICD-10-CM

## 2022-03-29 DIAGNOSIS — E11.40 TYPE 2 DIABETES MELLITUS WITH DIABETIC NEUROPATHY, WITH LONG-TERM CURRENT USE OF INSULIN (HCC): Primary | ICD-10-CM

## 2022-03-29 DIAGNOSIS — E78.2 MIXED HYPERLIPIDEMIA: ICD-10-CM

## 2022-03-29 PROCEDURE — 99215 OFFICE O/P EST HI 40 MIN: CPT | Performed by: INTERNAL MEDICINE

## 2022-03-29 RX ORDER — INSULIN GLARGINE 100 [IU]/ML
INJECTION, SOLUTION SUBCUTANEOUS
Qty: 15 ML | Refills: 5
Start: 2022-03-29 | End: 2022-05-10 | Stop reason: SDUPTHER

## 2022-03-29 RX ORDER — PEN NEEDLE, DIABETIC 32GX 5/32"
NEEDLE, DISPOSABLE MISCELLANEOUS
Qty: 200 EACH | Refills: 3 | Status: SHIPPED | OUTPATIENT
Start: 2022-03-29 | End: 2022-06-28 | Stop reason: SDUPTHER

## 2022-03-29 RX ORDER — INSULIN ASPART INJECTION 100 [IU]/ML
5 INJECTION, SOLUTION SUBCUTANEOUS
Qty: 15 ML | Refills: 6 | Status: SHIPPED | OUTPATIENT
Start: 2022-03-29 | End: 2022-06-28 | Stop reason: SDUPTHER

## 2022-03-29 NOTE — PATIENT INSTRUCTIONS
hgba1c is 8 9%  This is the same but you have only been on insulin for short while  Let's add short acting insulin at dinner, 5 units of fiasp  Decrease the lantus to 7 units daily  Call in the sugars in 2 weeks  continue to test blood sugars 2-3 times a day  The rest of the blood work is good  Follow up with PCP for the abnormal CBC with low indices  follow up in 3 months with blood work

## 2022-03-29 NOTE — PROGRESS NOTES
3/30/2022    Assessment/Plan      Diagnoses and all orders for this visit:    Type 2 diabetes mellitus with diabetic neuropathy, with long-term current use of insulin (HCC)  -     insulin aspart, w/niacinamide, (Fiasp FlexTouch) 100 Units/mL injection pen; Inject 5 Units under the skin daily with dinner  -     HEMOGLOBIN A1C W/ EAG ESTIMATION Lab Collect; Future  -     Comprehensive metabolic panel Lab Collect; Future    Primary hypertension  -     HEMOGLOBIN A1C W/ EAG ESTIMATION Lab Collect; Future  -     Comprehensive metabolic panel Lab Collect; Future    Mixed hyperlipidemia  -     HEMOGLOBIN A1C W/ EAG ESTIMATION Lab Collect; Future  -     Comprehensive metabolic panel Lab Collect; Future    Type 2 diabetes mellitus with diabetic neuropathy, without long-term current use of insulin ([facility])  -     insulin glargine (Lantus SoloStar) 100 units/mL injection pen; 7 unis qhs  -     Insulin Pen Needle (BD Pen Needle Barbie U/F) 32G X 4 MM MISC; Use one new needle 2 times daily  -     sitaGLIPtin (Januvia) 100 mg tablet; Take 1 tablet (100 mg total) by mouth daily    Type 2 diabetes mellitus with hyperglycemia, without long-term current use of insulin (HCC)  -     Canagliflozin (Invokana) 300 MG TABS; Take 1 tablet (300 mg total) by mouth daily before breakfast    Type 2 diabetes mellitus with complication, without long-term current use of insulin (HCC)  -     metFORMIN (GLUCOPHAGE) 1000 MG tablet; Take 1 tablet (1,000 mg total) by mouth 2 (two) times a day with meals        Assessm 1  Type 2 diabetes ent/Plan:  1  Type 2 diabetes, insulin requiring  Hemoglobin A1c is 8 9%  His hemoglobin A1c is not improved despite the addition of Lantus but he has only been on Lantus for about 6 weeks  His blood sugar record demonstrates high blood sugars in the 200s by bedtime so I will have him add short-acting insulin, Fiasp 5 units at supper and decrease his Lantus insulin to 7 units daily to prevent hypoglycemia in the morning  He will continue to test his blood sugars 2 to 3 times a day and call in his sugars in 2 weeks  Of note, he does have a normal CBC but low MCH and MCHC  I have asked him to follow up with his primary care physician regarding this for further workup if needed  2  Diabetic neuropathy  He has stable neuropathic symptoms  Diabetic foot exams are up-to-date    3  Hypertension  Blood pressure is under fair control on his current dose of lisinopril/HCTZ  4  Hyperlipidemia  Lipid profile is excellent  He will continue the same atorvastatin 10 mg daily  I have asked him to follow up in 3 months with preceding hemoglobin A1c and CMP  CC: Diabetes type 2, blood pressure, lipid follow-up    History of Present Illness     HPI: Marian Rangel is a 73y o  year old male with type 2 diabetes, insulin requiring with neuropathy for 10 years, hypertension, hyperlipidemia for follow-up visit  He is on oral agents and insulin at home and takes glipizide 5 mg 3 tablets in the morning and 1 tablet in the evening, Lantus insulin 10 units at bedtime, Januvia 100 mg daily, pioglitazone 15 mg daily, metformin 1000 mg twice a day, and Invokana 300 mg daily  He was started on insulin therapy in January 2022  He denies any polyuria, polyphagia, and blurry vision  He has polydipsia with dry mouth and occasional once a night nocturia  He has unchanged numbness and tingling of the feet  He denies chest pain or shortness of breath  He denies nephropathy, retinopathy, heart attack, stroke and claudication but does admit to neuropathy  Anh Dryer yesterday and hit hands  Taking advil overnight  Hypoglycemic episodes: Yes occasional  Will feel off in the 70s  The patient's last eye exam was in October 2021 with no retinopathy  The patient's last foot exam was in December 2021 at endocrine office visit  He does not see Podiatry   Last A1C was   Lab Results   Component Value Date    HGBA1C 8 9 (H) 03/24/2022      Blood Sugar/Glucometer/Pump/CGM review: checks blood sugars at least 2-3 times a day  Before breakfast: 80-120s  Before lunch:   Before dinner:   Bedtime: upper 100s to 200s  He has hyperlipidemia and takes atorvastatin 10 mg daily  He denies chest pain or shortness of breath  He has hypertension and takes lisinopril/HCTZ 10/12 5 mg daily  He has some increase in headaches since being started on insulin  He has no stroke-like symptoms  Review of Systems   Constitutional: Positive for fatigue  Negative for unexpected weight change  Weight 3 lbs more than last visit  Some worsening fatigue in general since retired  HENT: Negative for trouble swallowing  Eyes: Negative for visual disturbance  Wears glasses  Respiratory: Negative for chest tightness and shortness of breath  Cardiovascular: Negative for chest pain and leg swelling  Gastrointestinal: Positive for constipation  Negative for abdominal pain, diarrhea and nausea  Can be constipated at times  Endocrine: Positive for polydipsia  Negative for polyphagia and polyuria  Nocturia occasionally once a night, sometimes not sleeping well  Gets dry mouth easily so drinks a lot of water  Skin: Negative for rash  Neurological: Positive for numbness and headaches  Negative for dizziness, weakness and light-headedness  Unchanged numbness and tingling of the feet  Some increase in headaches since being on the insulin  Psychiatric/Behavioral: Positive for sleep disturbance  Does not sleep well some nights         Historical Information   Past Medical History:   Diagnosis Date    Dupuytren's contracture of hand     bilateral    GERD (gastroesophageal reflux disease)     Migraine     Osteoarthritis     Seasonal allergies      Past Surgical History:   Procedure Laterality Date    CATARACT EXTRACTION, BILATERAL Bilateral     INGUINAL HERNIA REPAIR      VASECTOMY       Social History Social History     Substance and Sexual Activity   Alcohol Use No     Social History     Substance and Sexual Activity   Drug Use No     Social History     Tobacco Use   Smoking Status Never Smoker   Smokeless Tobacco Never Used     Family History:   Family History   Problem Relation Age of Onset    Hypertension Mother     Dementia Mother     Diabetes type II Mother     Lung cancer Father     Diabetes type II Father     Diabetes type II Sister     Arthritis Sister     Diabetes type II Brother     Diabetes unspecified Sister         prediabetes    Arthritis Sister     Hypertension Son     Obesity Son        Meds/Allergies   Current Outpatient Medications   Medication Sig Dispense Refill    Ascorbic Acid (VITAMIN C) 1000 MG tablet Take 1,000 mg by mouth daily      aspirin (ASPIRIN 81) 81 mg chewable tablet Chew 81 mg daily      atorvastatin (LIPITOR) 10 mg tablet TAKE 1 TABLET BY MOUTH EVERY DAY 90 tablet 6    Blood Glucose Monitoring Suppl (ONE TOUCH ULTRA 2) w/Device KIT Use once daily 1 each 0    Canagliflozin (Invokana) 300 MG TABS Take 1 tablet (300 mg total) by mouth daily before breakfast 90 tablet 3    Cholecalciferol (Vitamin D) 125 MCG (5000 UT) CAPS Take by mouth daily      glipiZIDE (GLUCOTROL) 5 mg tablet TAKE 3 TABLETS BY MOUTH IN THE MORNING, AND 1 TABLET BY MOUTH IN THE EVENING  360 tablet 3    glucose blood (OneTouch Ultra) test strip daily **DX E11 40** 300 strip 3    insulin glargine (Lantus SoloStar) 100 units/mL injection pen 7 unis qhs 15 mL 5    Insulin Pen Needle (BD Pen Needle Barbie U/F) 32G X 4 MM MISC Use one new needle 2 times daily  200 each 3    Lancets (OneTouch Delica Plus SFFSZC31G) MISC Test 3 times daily   E11 40 300 each 3    lisinopril-hydrochlorothiazide (PRINZIDE,ZESTORETIC) 10-12 5 MG per tablet Take 1 tablet by mouth daily      Loratadine-Pseudoephedrine (CLARITIN-D 12 HOUR PO) Take 1 tablet by mouth daily        metFORMIN (GLUCOPHAGE) 1000 MG tablet Take 1 tablet (1,000 mg total) by mouth 2 (two) times a day with meals 180 tablet 3    Multiple Vitamin (MULTI-VITAMIN DAILY PO) Take by mouth daily        omeprazole (PriLOSEC) 20 mg delayed release capsule Take 20 mg by mouth daily      pioglitazone (ACTOS) 15 mg tablet Take 1 tablet (15 mg total) by mouth daily 90 tablet 3    sitaGLIPtin (Januvia) 100 mg tablet Take 1 tablet (100 mg total) by mouth daily 90 tablet 0    insulin aspart, w/niacinamide, (Fiasp FlexTouch) 100 Units/mL injection pen Inject 5 Units under the skin daily with dinner 15 mL 6     No current facility-administered medications for this visit  Allergies   Allergen Reactions    Iron GI Intolerance     Upsets stomach       Objective   Vitals: Blood pressure 142/80, pulse 78, height 5' 11" (1 803 m), weight 113 kg (248 lb 12 8 oz), SpO2 94 %  Invasive Devices  Report    None                 Physical Exam  Vitals reviewed  Constitutional:       Appearance: Normal appearance  He is well-developed  HENT:      Head: Normocephalic and atraumatic  Eyes:      Conjunctiva/sclera: Conjunctivae normal    Neck:      Thyroid: No thyromegaly  Vascular: No carotid bruit  Comments: Thyroid normal in size  Cardiovascular:      Rate and Rhythm: Normal rate and regular rhythm  Heart sounds: Normal heart sounds  No murmur heard  Pulmonary:      Effort: Pulmonary effort is normal       Breath sounds: Normal breath sounds  No wheezing  Abdominal:      Palpations: Abdomen is soft  Musculoskeletal:         General: No deformity  Normal range of motion  Cervical back: Normal range of motion and neck supple  Right lower leg: No edema  Left lower leg: No edema  Lymphadenopathy:      Cervical: No cervical adenopathy  Skin:     General: Skin is warm and dry  Findings: No erythema or rash  Neurological:      Mental Status: He is alert and oriented to person, place, and time        Deep Tendon Reflexes: Reflexes are normal and symmetric  The history was obtained from the review of the chart and from the patient  Lab Results:    Most recent Alc is  Lab Results   Component Value Date    HGBA1C 8 9 (H) 03/24/2022           Blood work done on 03/24/2022 showed a CMP with a glucose of 116 fasting but was otherwise normal   Lab Results   Component Value Date    CREATININE 1 05 03/24/2022    CREATININE 1 11 12/03/2021    CREATININE 1 19 08/13/2021    BUN 19 03/24/2022    K 4 0 03/24/2022    CL 99 03/24/2022    CO2 21 03/24/2022     eGFR   Date Value Ref Range Status   03/24/2022 77 >59 mL/min/1 73 Final       Total cholesterol 158, LDL cholesterol 79  Lab Results   Component Value Date    HDL 57 03/24/2022    TRIG 123 03/24/2022       Lab Results   Component Value Date    ALT 19 03/24/2022    AST 17 03/24/2022       Lab Results   Component Value Date    TSH 1 510 03/24/2022     Urine microalbumin to creatinine ratio is less than 3  PSA is 1 3  CBC shows a normal hemoglobin and hematocrit but MCH is low at 24 8, MCHC is low at 31 3, and RDW was elevated at 15 9      Future Appointments   Date Time Provider Raleigh Loving   7/1/2022  9:20 AM Jeramie Coronado MD [facility]

## 2022-04-13 ENCOUNTER — TELEPHONE (OUTPATIENT)
Dept: ENDOCRINOLOGY | Facility: HOSPITAL | Age: 72
End: 2022-04-13

## 2022-04-13 NOTE — TELEPHONE ENCOUNTER
Justine Erazo, when he eats lunch, add 5 units of fiasp at lunch  Continue the same fiasp 5 units at supper and bedtime lantus, please have another blood sugar record downloaded in 2 weeks

## 2022-04-27 ENCOUNTER — TELEPHONE (OUTPATIENT)
Dept: ENDOCRINOLOGY | Facility: HOSPITAL | Age: 72
End: 2022-04-27

## 2022-05-06 DIAGNOSIS — E11.40 TYPE 2 DIABETES MELLITUS WITH DIABETIC NEUROPATHY, WITHOUT LONG-TERM CURRENT USE OF INSULIN (HCC): ICD-10-CM

## 2022-05-06 RX ORDER — BLOOD SUGAR DIAGNOSTIC
STRIP MISCELLANEOUS
Qty: 300 STRIP | Refills: 3 | Status: SHIPPED | OUTPATIENT
Start: 2022-05-06 | End: 2022-05-07

## 2022-05-07 DIAGNOSIS — E11.40 TYPE 2 DIABETES MELLITUS WITH DIABETIC NEUROPATHY, WITHOUT LONG-TERM CURRENT USE OF INSULIN (HCC): ICD-10-CM

## 2022-05-07 RX ORDER — BLOOD SUGAR DIAGNOSTIC
STRIP MISCELLANEOUS
Qty: 300 STRIP | Refills: 3 | Status: SHIPPED | OUTPATIENT
Start: 2022-05-07

## 2022-05-07 RX ORDER — BLOOD SUGAR DIAGNOSTIC
STRIP MISCELLANEOUS
Qty: 300 STRIP | Refills: 3 | Status: SHIPPED | OUTPATIENT
Start: 2022-05-07 | End: 2022-05-07 | Stop reason: SDUPTHER

## 2022-05-10 DIAGNOSIS — E11.40 TYPE 2 DIABETES MELLITUS WITH DIABETIC NEUROPATHY, WITHOUT LONG-TERM CURRENT USE OF INSULIN (HCC): Primary | ICD-10-CM

## 2022-05-10 RX ORDER — INSULIN GLARGINE 100 [IU]/ML
INJECTION, SOLUTION SUBCUTANEOUS
Qty: 15 ML | Refills: 5 | Status: SHIPPED | OUTPATIENT
Start: 2022-05-10

## 2022-05-11 ENCOUNTER — TELEPHONE (OUTPATIENT)
Dept: ENDOCRINOLOGY | Facility: HOSPITAL | Age: 72
End: 2022-05-11

## 2022-05-11 NOTE — TELEPHONE ENCOUNTER
Ok, then change to 7 units at lunch and 8 units at supper and decrease to 6 units lantus at bedtime

## 2022-05-24 ENCOUNTER — TELEPHONE (OUTPATIENT)
Dept: ENDOCRINOLOGY | Facility: HOSPITAL | Age: 72
End: 2022-05-24

## 2022-06-22 ENCOUNTER — TELEPHONE (OUTPATIENT)
Dept: ENDOCRINOLOGY | Facility: HOSPITAL | Age: 72
End: 2022-06-22

## 2022-06-23 NOTE — TELEPHONE ENCOUNTER
Please clarify, patient has been taking Lantus 6 units at night, Fiasp 7 units at lunch and 9 units at dinner

## 2022-06-28 DIAGNOSIS — E11.40 TYPE 2 DIABETES MELLITUS WITH DIABETIC NEUROPATHY, WITH LONG-TERM CURRENT USE OF INSULIN (HCC): ICD-10-CM

## 2022-06-28 DIAGNOSIS — E11.40 TYPE 2 DIABETES MELLITUS WITH DIABETIC NEUROPATHY, WITHOUT LONG-TERM CURRENT USE OF INSULIN (HCC): ICD-10-CM

## 2022-06-28 DIAGNOSIS — Z79.4 TYPE 2 DIABETES MELLITUS WITH DIABETIC NEUROPATHY, WITH LONG-TERM CURRENT USE OF INSULIN (HCC): ICD-10-CM

## 2022-06-28 RX ORDER — INSULIN ASPART INJECTION 100 [IU]/ML
INJECTION, SOLUTION SUBCUTANEOUS
Qty: 30 ML | Refills: 3 | Status: SHIPPED | OUTPATIENT
Start: 2022-06-28 | End: 2022-07-01 | Stop reason: SDUPTHER

## 2022-06-28 RX ORDER — LANCETS 33 GAUGE
EACH MISCELLANEOUS
Qty: 300 EACH | Refills: 3 | Status: SHIPPED | OUTPATIENT
Start: 2022-06-28

## 2022-06-28 RX ORDER — PEN NEEDLE, DIABETIC 32GX 5/32"
NEEDLE, DISPOSABLE MISCELLANEOUS
Qty: 200 EACH | Refills: 3 | Status: SHIPPED | OUTPATIENT
Start: 2022-06-28 | End: 2022-07-01 | Stop reason: SDUPTHER

## 2022-06-29 LAB
ALBUMIN SERPL-MCNC: 4.1 G/DL (ref 3.8–4.8)
ALBUMIN/GLOB SERPL: 1.6 {RATIO} (ref 1.2–2.2)
ALP SERPL-CCNC: 61 IU/L (ref 44–121)
ALT SERPL-CCNC: 14 IU/L (ref 0–44)
AST SERPL-CCNC: 15 IU/L (ref 0–40)
BILIRUB SERPL-MCNC: 0.3 MG/DL (ref 0–1.2)
BUN SERPL-MCNC: 19 MG/DL (ref 8–27)
BUN/CREAT SERPL: 19 (ref 10–24)
CALCIUM SERPL-MCNC: 9.4 MG/DL (ref 8.6–10.2)
CHLORIDE SERPL-SCNC: 104 MMOL/L (ref 96–106)
CO2 SERPL-SCNC: 22 MMOL/L (ref 20–29)
CREAT SERPL-MCNC: 1 MG/DL (ref 0.76–1.27)
EGFR: 81 ML/MIN/1.73
EST. AVERAGE GLUCOSE BLD GHB EST-MCNC: 166 MG/DL
GLOBULIN SER-MCNC: 2.6 G/DL (ref 1.5–4.5)
GLUCOSE SERPL-MCNC: 103 MG/DL (ref 65–99)
HBA1C MFR BLD: 7.4 % (ref 4.8–5.6)
POTASSIUM SERPL-SCNC: 4.4 MMOL/L (ref 3.5–5.2)
PROT SERPL-MCNC: 6.7 G/DL (ref 6–8.5)
SODIUM SERPL-SCNC: 139 MMOL/L (ref 134–144)

## 2022-07-01 ENCOUNTER — OFFICE VISIT (OUTPATIENT)
Dept: ENDOCRINOLOGY | Facility: HOSPITAL | Age: 72
End: 2022-07-01
Payer: MEDICARE

## 2022-07-01 VITALS
SYSTOLIC BLOOD PRESSURE: 138 MMHG | WEIGHT: 245 LBS | HEIGHT: 71 IN | HEART RATE: 94 BPM | BODY MASS INDEX: 34.3 KG/M2 | DIASTOLIC BLOOD PRESSURE: 62 MMHG

## 2022-07-01 DIAGNOSIS — I10 PRIMARY HYPERTENSION: ICD-10-CM

## 2022-07-01 DIAGNOSIS — E11.40 TYPE 2 DIABETES MELLITUS WITH DIABETIC NEUROPATHY, WITH LONG-TERM CURRENT USE OF INSULIN (HCC): Primary | ICD-10-CM

## 2022-07-01 DIAGNOSIS — E11.40 TYPE 2 DIABETES MELLITUS WITH DIABETIC NEUROPATHY, WITHOUT LONG-TERM CURRENT USE OF INSULIN (HCC): ICD-10-CM

## 2022-07-01 DIAGNOSIS — Z79.4 TYPE 2 DIABETES MELLITUS WITH DIABETIC NEUROPATHY, WITH LONG-TERM CURRENT USE OF INSULIN (HCC): Primary | ICD-10-CM

## 2022-07-01 DIAGNOSIS — E78.2 MIXED HYPERLIPIDEMIA: ICD-10-CM

## 2022-07-01 PROCEDURE — 99214 OFFICE O/P EST MOD 30 MIN: CPT | Performed by: INTERNAL MEDICINE

## 2022-07-01 RX ORDER — PEN NEEDLE, DIABETIC 32GX 5/32"
NEEDLE, DISPOSABLE MISCELLANEOUS
Qty: 300 EACH | Refills: 3 | Status: SHIPPED | OUTPATIENT
Start: 2022-07-01

## 2022-07-01 RX ORDER — INSULIN ASPART INJECTION 100 [IU]/ML
INJECTION, SOLUTION SUBCUTANEOUS
Qty: 30 ML | Refills: 3 | Status: SHIPPED | OUTPATIENT
Start: 2022-07-01

## 2022-07-01 NOTE — PROGRESS NOTES
7/3/2022    Assessment/Plan      Diagnoses and all orders for this visit:    Type 2 diabetes mellitus with diabetic neuropathy, with long-term current use of insulin (HCC)  -     insulin aspart, w/niacinamide, (Fiasp FlexTouch) 100 Units/mL injection pen; 7 units with lunch and 10 units with dinner   -     Insulin Pen Needle (BD Pen Needle Barbie U/F) 32G X 4 MM MISC; Use one new needle 3 times daily   -     HEMOGLOBIN A1C W/ EAG ESTIMATION Lab Collect; Future  -     Comprehensive metabolic panel Lab Collect; Future    Primary hypertension  -     HEMOGLOBIN A1C W/ EAG ESTIMATION Lab Collect; Future  -     Comprehensive metabolic panel Lab Collect; Future    Mixed hyperlipidemia  -     HEMOGLOBIN A1C W/ EAG ESTIMATION Lab Collect; Future  -     Comprehensive metabolic panel Lab Collect; Future    Type 2 diabetes mellitus with diabetic neuropathy, without long-term current use of insulin (HCC)  -     insulin aspart, w/niacinamide, (Fiasp FlexTouch) 100 Units/mL injection pen; 7 units with lunch and 10 units with dinner   -     Insulin Pen Needle (BD Pen Needle Barbie U/F) 32G X 4 MM MISC; Use one new needle 3 times daily  -     sitaGLIPtin (Januvia) 100 mg tablet; Take 1 tablet (100 mg total) by mouth daily        Assessment/Plan:  1  Type 2 diabetes, insulin-requiring  Hemoglobin A1c is 7 4%  This is significantly better than previous hemoglobin A1c  For now, he will continue the same Januvia, Invokana, metformin, pioglitazone, glipizide, Lantus insulin, and fiasp insulin with her current dosages  I did say that he could decrease his testing to 3 times a day  2  Diabetic neuropathy  He has unchanged extremity paresthesias  Diabetic foot exams are up-to-date  3  Hypertension  He is normotensive in the office on his current dose of lisinopril/HCTZ  4  Hyperlipidemia  He will continue the same atorvastatin 10 mg daily        I have asked him to follow up in 3 months with preceding hemoglobin A1c and CMP       CC: Diabetes type 2, blood pressure, lipid follow-up    History of Present Illness     HPI: Chayito Lacy is a 73y o  year old male with type 2 diabetes, insulin-requiring with neuropathy for 10 years, hypertension, hyperlipidemia for follow-up visit  He is on oral agents and insulin at home and takes Invokana 300 mg daily, metformin 1000 mg twice a day, pioglitazone 15 mg daily, Januvia 100 mg daily, glipizide 5 mg 3 tablets in the a m  and 1 tablet in the p m , Lantus insulin 6 units at bedtime, and Fiasp insulin 7 units at lunch and 10 units at supper  He denies any polyuria, polydipsia, polyphagia, and blurry vision  He is occasional nocturia  He has numbness and tingling of the feet unchanged  He denies chest pain or shortness of breath  He denies nephropathy, retinopathy, heart attack, stroke and claudication but does admit to neuropathy  Hypoglycemic episodes: No rare  The patient's last eye exam was in October 2021 with no retinopathy  The patient's last foot exam was in December 2021 at Endocrine office visit  He does not see Podiatry  Last A1C was   Lab Results   Component Value Date    HGBA1C 7 4 (H) 06/28/2022     Blood Sugar/Glucometer/Pump/CGM review: checks blood sugars 4 times a day  Blood sugar record was reviewed in the office from 06/17/2022 through 07/01/2022  Blood sugars ranged from 82-70 with the average 150 mg/dL  Most of his higher blood sugars are at bedtime  Those have significantly improved over the last week with the last adjustment in insulin  He has hyperlipidemia and takes atorvastatin 10 mg daily  He denies chest pain or shortness of breath     He has hypertension and takes lisinopril/HCTZ 10/12 5 mg daily  He denies headache or stroke-like symptoms  Review of Systems   Constitutional: Negative for fatigue and unexpected weight change  Weight 3 lb less than last visit some nibbling on food at night      Eyes: Negative for visual disturbance  Wears glasses  Respiratory: Negative for chest tightness and shortness of breath  Cardiovascular: Negative for chest pain and leg swelling  Gastrointestinal: Positive for constipation  Negative for abdominal pain, diarrhea and nausea  Occasional constipation  Endocrine: Negative for polydipsia, polyphagia and polyuria  Nocturia occasionally, not recently  Skin: Negative for wound  Neurological: Positive for dizziness, numbness and headaches  Negative for weakness and light-headedness  Occasionally dizzy at times for fleeting moment, room can spin  Usually when standing up  no change in numbness and tingling of the feet  Gets occasional headache  Psychiatric/Behavioral: Negative for sleep disturbance         Historical Information   Past Medical History:   Diagnosis Date    Dupuytren's contracture of hand     bilateral    GERD (gastroesophageal reflux disease)     Migraine     Osteoarthritis     Seasonal allergies      Past Surgical History:   Procedure Laterality Date    CATARACT EXTRACTION, BILATERAL Bilateral     INGUINAL HERNIA REPAIR      VASECTOMY       Social History   Social History     Substance and Sexual Activity   Alcohol Use No     Social History     Substance and Sexual Activity   Drug Use No     Social History     Tobacco Use   Smoking Status Never Smoker   Smokeless Tobacco Never Used     Family History:   Family History   Problem Relation Age of Onset    Hypertension Mother     Dementia Mother     Diabetes type II Mother     Lung cancer Father     Diabetes type II Father     Diabetes type II Sister     Arthritis Sister     Diabetes type II Brother     Diabetes unspecified Sister         prediabetes    Arthritis Sister     Hypertension Son     Obesity Son        Meds/Allergies   Current Outpatient Medications   Medication Sig Dispense Refill    Ascorbic Acid (VITAMIN C) 1000 MG tablet Take 1,000 mg by mouth daily      aspirin 81 mg chewable tablet Chew 81 mg daily      atorvastatin (LIPITOR) 10 mg tablet TAKE 1 TABLET BY MOUTH EVERY DAY 90 tablet 6    Blood Glucose Monitoring Suppl (ONE TOUCH ULTRA 2) w/Device KIT Use once daily 1 each 0    Canagliflozin (Invokana) 300 MG TABS Take 1 tablet (300 mg total) by mouth daily before breakfast 90 tablet 3    Cholecalciferol (Vitamin D) 125 MCG (5000 UT) CAPS Take by mouth daily      glipiZIDE (GLUCOTROL) 5 mg tablet TAKE 3 TABLETS BY MOUTH IN THE MORNING, AND 1 TABLET BY MOUTH IN THE EVENING  360 tablet 3    glucose blood (OneTouch Ultra) test strip Use 3 times a day 300 strip 3    insulin aspart, w/niacinamide, (Fiasp FlexTouch) 100 Units/mL injection pen 7 units with lunch and 10 units with dinner  30 mL 3    insulin glargine (Lantus SoloStar) 100 units/mL injection pen 7 unis qhs (Patient taking differently: 6 units qhs) 15 mL 5    Insulin Pen Needle (BD Pen Needle Barbie U/F) 32G X 4 MM MISC Use one new needle 3 times daily  300 each 3    Lancets (OneTouch Delica Plus FTVIQC42N) MISC Test 3 times daily  E11 40 300 each 3    lisinopril-hydrochlorothiazide (PRINZIDE,ZESTORETIC) 10-12 5 MG per tablet Take 1 tablet by mouth daily      Loratadine-Pseudoephedrine (CLARITIN-D 12 HOUR PO) Take 1 tablet by mouth daily        metFORMIN (GLUCOPHAGE) 1000 MG tablet Take 1 tablet (1,000 mg total) by mouth 2 (two) times a day with meals 180 tablet 3    Multiple Vitamin (MULTI-VITAMIN DAILY PO) Take by mouth daily        omeprazole (PriLOSEC) 20 mg delayed release capsule Take 20 mg by mouth daily      pioglitazone (ACTOS) 15 mg tablet Take 1 tablet (15 mg total) by mouth daily 90 tablet 3    sitaGLIPtin (Januvia) 100 mg tablet Take 1 tablet (100 mg total) by mouth daily 90 tablet 3     No current facility-administered medications for this visit       Allergies   Allergen Reactions    Iron GI Intolerance     Upsets stomach       Objective   Vitals: Blood pressure 138/62, pulse 94, height 5' 11" (1 803 m), weight 111 kg (245 lb)  Invasive Devices  Report    None                 Physical Exam  Vitals reviewed  Constitutional:       Appearance: Normal appearance  He is well-developed  HENT:      Head: Normocephalic and atraumatic  Eyes:      Conjunctiva/sclera: Conjunctivae normal    Neck:      Thyroid: No thyromegaly  Vascular: No carotid bruit  Comments: Thyroid normal in size  Cardiovascular:      Rate and Rhythm: Normal rate and regular rhythm  Pulses: Normal pulses  Heart sounds: Normal heart sounds  No murmur heard  Pulmonary:      Effort: Pulmonary effort is normal       Breath sounds: Normal breath sounds  No wheezing  Abdominal:      Palpations: Abdomen is soft  Musculoskeletal:         General: No deformity  Normal range of motion  Cervical back: Normal range of motion and neck supple  Right lower leg: No edema  Left lower leg: No edema  Lymphadenopathy:      Cervical: No cervical adenopathy  Skin:     General: Skin is warm and dry  Findings: No erythema or rash  Neurological:      Mental Status: He is alert and oriented to person, place, and time  Deep Tendon Reflexes: Reflexes are normal and symmetric  The history was obtained from the review of the chart and from the patient      Lab Results:    Most recent Alc is  Lab Results   Component Value Date    HGBA1C 7 4 (H) 06/28/2022           Blood work done on 06/28/2022 showed a CMP with a glucose of 103 fasting but was otherwise normal     Lab Results   Component Value Date    CREATININE 1 00 06/28/2022    CREATININE 1 05 03/24/2022    CREATININE 1 11 12/03/2021    BUN 19 06/28/2022    K 4 4 06/28/2022     06/28/2022    CO2 22 06/28/2022     eGFR   Date Value Ref Range Status   06/28/2022 81 >59 mL/min/1 73 Final       Lab Results   Component Value Date    HDL 57 03/24/2022    TRIG 123 03/24/2022       Lab Results   Component Value Date    ALT 14 06/28/2022 AST 15 06/28/2022       Lab Results   Component Value Date    TSH 1 510 03/24/2022             Future Appointments   Date Time Provider Raleigh Loving   11/10/2022 10:20 AM Joe Linares MD [facility]

## 2022-07-01 NOTE — PATIENT INSTRUCTIONS
Hgba1c is 7 4%  this is much better  Continue the same medications of diabetes  You can decrease the testing to 3 times a day  Follow up in 3 months with blood work

## 2022-07-22 DIAGNOSIS — E78.2 MIXED HYPERLIPIDEMIA: ICD-10-CM

## 2022-07-22 RX ORDER — ATORVASTATIN CALCIUM 10 MG/1
TABLET, FILM COATED ORAL
Qty: 90 TABLET | Refills: 6 | Status: SHIPPED | OUTPATIENT
Start: 2022-07-22

## 2022-08-03 ENCOUNTER — TELEPHONE (OUTPATIENT)
Dept: ENDOCRINOLOGY | Facility: HOSPITAL | Age: 72
End: 2022-08-03

## 2022-08-03 NOTE — TELEPHONE ENCOUNTER
Blood sugar log for review  Regimen includes: Invokana 300 mg daily, metformin 1000 mg twice a day, pioglitazone 15 mg daily, Januvia 100 mg daily, glipizide 5 mg 3 tablets in the a m  and 1 tablet in the p m,Lantus insulin 6 units at bedtime, and Fiasp insulin 7 units at lunch and 10 units at supper

## 2022-08-04 NOTE — TELEPHONE ENCOUNTER
Reviewed most recent blood sugar logs  Looks to be checking blood sugars 3 to 4 times a day  Fasting blood sugars are typically within normal range, and typically in the later day he may have a higher blood sugar, but this is likely due to what he is eating at the time  Overall average blood sugars around 155  No adjustments to medication at this time  Would send in blood sugar logs again in about 2 weeks

## 2022-08-16 ENCOUNTER — TELEPHONE (OUTPATIENT)
Dept: ENDOCRINOLOGY | Facility: HOSPITAL | Age: 72
End: 2022-08-16

## 2022-08-16 NOTE — TELEPHONE ENCOUNTER
Blood sugar log for review  Patient reports an increase of stress recently  Regimen includes: Invokana 300 mg daily, metformin 1000 mg twice a day, pioglitazone 15 mg daily, Januvia 100 mg daily, glipizide 5 mg 3 tablets in the a m  and 1 tablet in the p m,Lantus insulin 6 units at bedtime, and Fiasp insulin 7 units at lunch and 10 units at supper

## 2022-08-16 NOTE — TELEPHONE ENCOUNTER
Patient  I reviewed his blood sugar record from 08/03/2022 through 08/16/2022  He is having some higher blood sugars before supper and at bedtime  This may be due to the stress  For now, lets increase his fiasp to 8 units at lunch and 11 units at supper  Continue the Lantus insulin, glipizide, Januvia, pioglitazone, metformin, and Invokana dosages the same  If his stress resolves and he starts to have lower blood sugars again, we can always decrease his insulin back to its previous dosages

## 2022-09-01 ENCOUNTER — TELEPHONE (OUTPATIENT)
Dept: ENDOCRINOLOGY | Facility: HOSPITAL | Age: 72
End: 2022-09-01

## 2022-09-01 NOTE — TELEPHONE ENCOUNTER
Patients glucometer download attached and in your bin for review   He is taking Invokana 300 mg daily, metformin 1000 mg twice a day, pioglitazone 15 mg daily, Januvia 100 mg daily, glipizide 5 mg 3 tablets in the a m  and 1 tablet in the p m, Lantus insulin 6 units at bedtime, and Fiasp 8 units at lunch and 11 units at supper

## 2022-09-01 NOTE — TELEPHONE ENCOUNTER
Call patient  I reviewed his blood sugar record from 08/17/2022 through 08/31/2022  Blood sugars do continue to be high at supper and bedtime but are improving overall  Lets adjust his fiasp insulin to 9 units at lunch and 11 at supper  Continue the same Lantus insulin 60 units at bedtime, Januvia, glipizide, pioglitazone, metformin, and Invokana

## 2022-09-15 ENCOUNTER — DOCUMENTATION (OUTPATIENT)
Dept: ENDOCRINOLOGY | Facility: HOSPITAL | Age: 72
End: 2022-09-15

## 2022-09-15 NOTE — PROGRESS NOTES
Call patient  I reviewed his blood sugar record from 09/01/2022 through 09/15/2022  Continues to have elevated blood sugars particularly at bedtime  The morning blood sugars can be on the low side  Lets adjust his insulin to Lantus insulin 6 units at bedtime and Fiasp insulin 9 units at lunch and 13 units at supper  Continue the same metformin, glipizide, pioglitazone, Invokana, and Januvia  Send in another blood sugar record in 2 weeks please

## 2022-09-29 ENCOUNTER — TELEPHONE (OUTPATIENT)
Dept: ENDOCRINOLOGY | Facility: HOSPITAL | Age: 72
End: 2022-09-29

## 2022-09-29 NOTE — TELEPHONE ENCOUNTER
The patient came into the office today for a meter download  It is being scanned into your chart and is at your desk in your in box for review

## 2022-09-29 NOTE — TELEPHONE ENCOUNTER
Call patient  I reviewed his blood sugar record from 09/15/2022 through 09/29/2022  He still has higher blood sugars at bedtime  Morning blood sugars are doing pretty good so lets continue the same Lantus insulin 6 units at bedtime but increase his Fiasp insulin to 9 units at lunch and 15 units at supper  Continue the same glipizide, Januvia, pioglitazone, and metformin

## 2022-09-30 NOTE — TELEPHONE ENCOUNTER
Yes, I realize that, that is why I only slightly increase the supper dose to 15 units of fiasp daily and left all other doses the same

## 2022-10-13 ENCOUNTER — TELEPHONE (OUTPATIENT)
Dept: ENDOCRINOLOGY | Facility: HOSPITAL | Age: 72
End: 2022-10-13

## 2022-10-13 NOTE — TELEPHONE ENCOUNTER
The patient came in to the office today and brought in his meter for a download  It is being scanned into his chart and a copy is in your bin

## 2022-10-14 ENCOUNTER — TELEPHONE (OUTPATIENT)
Dept: ENDOCRINOLOGY | Facility: HOSPITAL | Age: 72
End: 2022-10-14

## 2022-10-14 DIAGNOSIS — E11.40 TYPE 2 DIABETES MELLITUS WITH DIABETIC NEUROPATHY, WITHOUT LONG-TERM CURRENT USE OF INSULIN (HCC): Primary | ICD-10-CM

## 2022-10-14 RX ORDER — PIOGLITAZONEHYDROCHLORIDE 15 MG/1
15 TABLET ORAL DAILY
Qty: 90 TABLET | Refills: 3 | Status: SHIPPED | OUTPATIENT
Start: 2022-10-14

## 2022-10-14 NOTE — TELEPHONE ENCOUNTER
Without seeing the rash, I can not be sure  It may be a fungal infection  He could try lotrimin cream twice a day which is over the counter  If this does not work, we can use prescription medication

## 2022-10-14 NOTE — TELEPHONE ENCOUNTER
I was able to call the patient back and make him aware of the recommendation of the cream to use  He will try it over the weekend and he will call if it does not seem to help

## 2022-10-14 NOTE — TELEPHONE ENCOUNTER
Call patient  I reviewed his blood sugar record from 09/29 through 10/13/2022  Blood sugars overall are continuing to improve and his blood sugars at bedtime are not as high as they were previously  For now, lets continue the same insulin dosages and diabetes pills  Have him send in another blood sugar record in 2 weeks to see if they are still higher than I would like him to be at bedtime

## 2022-10-18 LAB
LEFT EYE DIABETIC RETINOPATHY: NORMAL
RIGHT EYE DIABETIC RETINOPATHY: NORMAL

## 2022-10-27 ENCOUNTER — TELEPHONE (OUTPATIENT)
Dept: ENDOCRINOLOGY | Facility: HOSPITAL | Age: 72
End: 2022-10-27

## 2022-10-27 NOTE — TELEPHONE ENCOUNTER
The patient stopped by the office today for a meter download  A copy will be in your bin and it will be scanned into his chart as well    Please review

## 2022-10-28 NOTE — TELEPHONE ENCOUNTER
Call patient  I reviewed his blood sugar record from 10/13/2022 through 10/27/2022  His blood sugars are improving  He is not having as many spikes of blood sugars so I think for now I would give it a little bit more time and continue the same Lantus insulin, feet has been insulin, Januvia, pioglitazone, metformin, and glipizide  Have him bring his monitor in with his next visit and we can reassess at that time

## 2022-11-23 LAB
ALBUMIN SERPL-MCNC: 4.1 G/DL (ref 3.8–4.8)
ALBUMIN/GLOB SERPL: 1.5 {RATIO} (ref 1.2–2.2)
ALP SERPL-CCNC: 62 IU/L (ref 44–121)
ALT SERPL-CCNC: 17 IU/L (ref 0–44)
AST SERPL-CCNC: 20 IU/L (ref 0–40)
BILIRUB SERPL-MCNC: 0.3 MG/DL (ref 0–1.2)
BUN SERPL-MCNC: 16 MG/DL (ref 8–27)
BUN/CREAT SERPL: 15 (ref 10–24)
CALCIUM SERPL-MCNC: 9.3 MG/DL (ref 8.6–10.2)
CHLORIDE SERPL-SCNC: 102 MMOL/L (ref 96–106)
CO2 SERPL-SCNC: 23 MMOL/L (ref 20–29)
CREAT SERPL-MCNC: 1.06 MG/DL (ref 0.76–1.27)
EGFR: 75 ML/MIN/1.73
EST. AVERAGE GLUCOSE BLD GHB EST-MCNC: 163 MG/DL
GLOBULIN SER-MCNC: 2.8 G/DL (ref 1.5–4.5)
GLUCOSE SERPL-MCNC: 88 MG/DL (ref 70–99)
HBA1C MFR BLD: 7.3 % (ref 4.8–5.6)
POTASSIUM SERPL-SCNC: 4.4 MMOL/L (ref 3.5–5.2)
PROT SERPL-MCNC: 6.9 G/DL (ref 6–8.5)
SODIUM SERPL-SCNC: 140 MMOL/L (ref 134–144)

## 2022-11-30 ENCOUNTER — TELEPHONE (OUTPATIENT)
Dept: ENDOCRINOLOGY | Facility: HOSPITAL | Age: 72
End: 2022-11-30

## 2022-11-30 NOTE — TELEPHONE ENCOUNTER
Patient was unable to stay for his appointment today  Please review glucose logs and recent lab work  Patient rescheduled for 2/9/23

## 2022-11-30 NOTE — TELEPHONE ENCOUNTER
I reviewed his blood sugar download from November 16th through November 30, 2022  Blood sugars are quite variable with some 200 blood sugars throughout the day but no particular pattern  At the moment, his hemoglobin A1c is 7 3% which is quite good so lets continue the same glipizide, Januvia, pioglitazone, metformin, Lantus insulin, and Fiasp insulin dosages

## 2023-01-05 DIAGNOSIS — E11.8 TYPE 2 DIABETES MELLITUS WITH COMPLICATION, WITHOUT LONG-TERM CURRENT USE OF INSULIN (HCC): ICD-10-CM

## 2023-01-05 RX ORDER — GLIPIZIDE 5 MG/1
TABLET ORAL
Qty: 360 TABLET | Refills: 3 | Status: SHIPPED | OUTPATIENT
Start: 2023-01-05

## 2023-02-08 DIAGNOSIS — E11.65 TYPE 2 DIABETES MELLITUS WITH HYPERGLYCEMIA, WITHOUT LONG-TERM CURRENT USE OF INSULIN (HCC): ICD-10-CM

## 2023-02-08 DIAGNOSIS — E11.40 TYPE 2 DIABETES MELLITUS WITH DIABETIC NEUROPATHY, WITHOUT LONG-TERM CURRENT USE OF INSULIN (HCC): ICD-10-CM

## 2023-02-08 DIAGNOSIS — E11.8 TYPE 2 DIABETES MELLITUS WITH COMPLICATION, WITHOUT LONG-TERM CURRENT USE OF INSULIN (HCC): ICD-10-CM

## 2023-02-08 RX ORDER — PIOGLITAZONEHYDROCHLORIDE 15 MG/1
15 TABLET ORAL DAILY
Qty: 90 TABLET | Refills: 1 | Status: SHIPPED | OUTPATIENT
Start: 2023-02-08

## 2023-02-08 RX ORDER — PIOGLITAZONEHYDROCHLORIDE 15 MG/1
15 TABLET ORAL DAILY
Qty: 90 TABLET | Refills: 0 | Status: SHIPPED | OUTPATIENT
Start: 2023-02-08 | End: 2023-02-08 | Stop reason: SDUPTHER

## 2023-02-08 NOTE — TELEPHONE ENCOUNTER
Requested medication(s) are due for refill today:yes  Patient has already received a courtesy refill: no  Other reason request has been forwarded to provider: guidelines failed, previously sent to the wrong pharmacy

## 2023-02-09 ENCOUNTER — OFFICE VISIT (OUTPATIENT)
Dept: ENDOCRINOLOGY | Facility: HOSPITAL | Age: 73
End: 2023-02-09

## 2023-02-09 VITALS
HEIGHT: 71 IN | SYSTOLIC BLOOD PRESSURE: 144 MMHG | DIASTOLIC BLOOD PRESSURE: 80 MMHG | HEART RATE: 88 BPM | BODY MASS INDEX: 35.5 KG/M2 | WEIGHT: 253.6 LBS

## 2023-02-09 DIAGNOSIS — E78.2 MIXED HYPERLIPIDEMIA: ICD-10-CM

## 2023-02-09 DIAGNOSIS — E11.65 TYPE 2 DIABETES MELLITUS WITH HYPERGLYCEMIA, WITHOUT LONG-TERM CURRENT USE OF INSULIN (HCC): ICD-10-CM

## 2023-02-09 DIAGNOSIS — I10 PRIMARY HYPERTENSION: ICD-10-CM

## 2023-02-09 DIAGNOSIS — E11.40 TYPE 2 DIABETES MELLITUS WITH DIABETIC NEUROPATHY, WITH LONG-TERM CURRENT USE OF INSULIN (HCC): Primary | ICD-10-CM

## 2023-02-09 DIAGNOSIS — Z79.4 TYPE 2 DIABETES MELLITUS WITH DIABETIC NEUROPATHY, WITH LONG-TERM CURRENT USE OF INSULIN (HCC): Primary | ICD-10-CM

## 2023-02-09 NOTE — PATIENT INSTRUCTIONS
Hgba1c is 7 3%  this is not bad  For now, continue the same medications for diabetes  Continue to check blood sugars 3-4 times a day  Work on diet  Follow up in 6 months with blood work ordered by PCP  Call if you decide you want to switch to ozempic

## 2023-02-09 NOTE — PROGRESS NOTES
2/12/2023    Assessment/Plan      Diagnoses and all orders for this visit:    Type 2 diabetes mellitus with diabetic neuropathy, with long-term current use of insulin (HCC)    Primary hypertension    Mixed hyperlipidemia        Assessment/Plan:  1  Type 2 diabetes, insulin requiring  Hemoglobin A1c is 7 3% which is not bad  For now, he will continue to work on diet and lower carbs  He will continue the same Lantus insulin, metformin, Invokana, Januvia, pioglitazone, glipizide, and Fiasp insulin dosages  He will continue to test his blood sugars 3-4 times a day  We did discuss the possibility of utilizing Ozempic as his brother is on this medication and I discussed side effects and treatment and that he would have to discontinue Januvia start Ozempic  He is going to think about it and will call if he wants to make that switch  2   Hypertension  Blood pressure is slightly on the higher side this visit  For now, he will continue the same lisinopril/HCTZ  3   Hyperlipidemia  He will continue the same atorvastatin 10 mg daily  4   Diabetic neuropathy  He has unchanged neuropathic symptoms  Diabetic foot exam was performed in the office today  I have asked him to follow-up in 6 months and he has blood work for his primary care physician for which the results will be sent to our office and his blood work includes a hemoglobin A1c, CMP, lipid panel, urine microalbumin to creatinine ratio, TSH, and CBC  CC: Diabetes type II, blood pressure, lipid follow-up    History of Present Illness     HPI: Bob Reid is a 81y o  year old male with type 2 diabetes, insulin requiring with neuropathy for 11 years, hypertension, hyperlipidemia for follow-up visit    He is on oral agents and insulin at home and takes glipizide 5 mg 3 in the morning and 1 in the evening, Lantus insulin 6 units at night, Fiasp insulin 9 units at lunch and 15 units at supper, pioglitazone 15 mg daily, metformin 1000 mg twice daily, Invokana 300 mg daily, and Januvia 100 mg daily  He admits to some increase in polyuria, polydipsia, nocturia occasionally once a night, and blurry vision at times  He denies polyphasia  He has unchanged numbness and tingling of the feet  He has some shortness of breath with activity but no chest pain  He denies nephropathy, retinopathy, heart attack, stroke and claudication but does admit to neuropathy  Hypoglycemic episodes: Yes rare  The patient's last eye exam was in October 2022 with no retinopathy  The patient's last foot exam was in December 2021 at endocrine office visit  Last A1C was   Lab Results   Component Value Date    HGBA1C 7 3 (H) 11/22/2022     Blood Sugar/Glucometer/Pump/CGM review: checks blood sugars 3-4 times a day  He has hyperlipidemia and takes atorvastatin 10 mg daily  He has shortness of breath with activity but no chest pain  He has hypertension and takes lisinopril/HCTZ 10/12 5 mg daily  He denies headache or strokelike symptoms  Review of Systems   Constitutional: Negative for fatigue and unexpected weight change  HENT: Negative for trouble swallowing  Eyes: Positive for visual disturbance  Wears glasses  Some blurry vision at times  Respiratory: Positive for shortness of breath  Negative for chest tightness  SOB with activity  Cardiovascular: Negative for chest pain and leg swelling  Gastrointestinal: Positive for constipation  Negative for abdominal pain, diarrhea and nausea  Occasional constipation  Endocrine: Positive for polydipsia and polyuria  Negative for polyphagia  Some increase in polyuria  Nocturia occasionally once a night  Skin: Negative for wound  Neurological: Positive for numbness  Negative for dizziness, weakness, light-headedness and headaches  Has numbness and tingling of the feet unchanged  Psychiatric/Behavioral: Positive for sleep disturbance          Stress with wife precancerous lesion in pancreas  Sleeping poor in general having weird dreams  Historical Information   Past Medical History:   Diagnosis Date   • Dupuytren's contracture of hand     bilateral   • GERD (gastroesophageal reflux disease)    • Migraine    • Osteoarthritis    • Seasonal allergies      Past Surgical History:   Procedure Laterality Date   • CATARACT EXTRACTION, BILATERAL Bilateral    • INGUINAL HERNIA REPAIR     • VASECTOMY       Social History   Social History     Substance and Sexual Activity   Alcohol Use No     Social History     Substance and Sexual Activity   Drug Use No     Social History     Tobacco Use   Smoking Status Never   Smokeless Tobacco Never     Family History:   Family History   Problem Relation Age of Onset   • Hypertension Mother    • Dementia Mother    • Diabetes type II Mother            • Lung cancer Father    • Diabetes type II Father            • Diabetes type II Sister    • Arthritis Sister    • Diabetes type II Brother         Type 2 Diabetic   • Diabetes unspecified Sister         prediabetes   • Arthritis Sister    • Hypertension Son    • Obesity Son        Meds/Allergies   Current Outpatient Medications   Medication Sig Dispense Refill   • Ascorbic Acid (VITAMIN C) 1000 MG tablet Take 1,000 mg by mouth daily     • aspirin 81 mg chewable tablet Chew 81 mg daily     • atorvastatin (LIPITOR) 10 mg tablet TAKE 1 TABLET BY MOUTH EVERY DAY 90 tablet 6   • Blood Glucose Monitoring Suppl (ONE TOUCH ULTRA 2) w/Device KIT Use once daily 1 each 0   • Cholecalciferol (Vitamin D) 125 MCG (5000 UT) CAPS Take by mouth daily     • glipiZIDE (GLUCOTROL) 5 mg tablet TAKE 3 TABLETS BY MOUTH IN THE MORNING, AND 1 TABLET BY MOUTH IN THE EVENING  360 tablet 3   • glucose blood (OneTouch Ultra) test strip Use 3 times a day 300 strip 3   • insulin aspart, w/niacinamide, (Fiasp FlexTouch) 100 Units/mL injection pen 7 units with lunch and 10 units with dinner   (Patient taking differently: 9 units with lunch and 15 units with dinner ) 30 mL 3   • insulin glargine (Lantus SoloStar) 100 units/mL injection pen 7 unis qhs (Patient taking differently: 6 units qhs) 15 mL 5   • Insulin Pen Needle (BD Pen Needle Barbie U/F) 32G X 4 MM MISC Use one new needle 3 times daily  300 each 3   • Lancets (OneTouch Delica Plus IYZIIR10C) MISC Test 3 times daily  E11 40 300 each 3   • lisinopril-hydrochlorothiazide (PRINZIDE,ZESTORETIC) 10-12 5 MG per tablet Take 1 tablet by mouth daily     • Loratadine-Pseudoephedrine (CLARITIN-D 12 HOUR PO) Take 1 tablet by mouth daily       • metFORMIN (GLUCOPHAGE) 1000 MG tablet Take 1 tablet (1,000 mg total) by mouth 2 (two) times a day with meals 180 tablet 1   • Multiple Vitamin (MULTI-VITAMIN DAILY PO) Take by mouth daily       • omeprazole (PriLOSEC) 20 mg delayed release capsule Take 20 mg by mouth daily     • pioglitazone (ACTOS) 15 mg tablet Take 1 tablet (15 mg total) by mouth daily 90 tablet 1   • sitaGLIPtin (Januvia) 100 mg tablet Take 1 tablet (100 mg total) by mouth daily 90 tablet 3   • Canagliflozin (Invokana) 300 MG TABS Take 1 tablet (300 mg total) by mouth daily before breakfast 90 tablet 0     No current facility-administered medications for this visit  Allergies   Allergen Reactions   • Iron GI Intolerance     Upsets stomach       Objective   Vitals: Blood pressure 144/80, pulse 88, height 5' 11" (1 803 m), weight 115 kg (253 lb 9 6 oz)  Invasive Devices     None                 Physical Exam  Vitals reviewed  Constitutional:       Appearance: Normal appearance  He is well-developed  He is obese  HENT:      Head: Normocephalic and atraumatic  Eyes:      Conjunctiva/sclera: Conjunctivae normal    Neck:      Thyroid: No thyromegaly  Vascular: No carotid bruit  Comments: Thyroid normal in size  Cardiovascular:      Rate and Rhythm: Normal rate and regular rhythm  Pulses: Pulses are weak             Dorsalis pedis pulses are 1+ on the right side and 1+ on the left side  Posterior tibial pulses are 1+ on the right side and 1+ on the left side  Heart sounds: Normal heart sounds  No murmur heard  Comments: 1+ dorsalis pedis and posterior tibialis pulses bilaterally  Pulmonary:      Effort: Pulmonary effort is normal       Breath sounds: Normal breath sounds  No wheezing  Abdominal:      Palpations: Abdomen is soft  Musculoskeletal:         General: No deformity  Cervical back: Normal range of motion and neck supple  Right lower leg: No edema  Left lower leg: No edema  Comments: No ulcerations of the feet  Feet:      Right foot:      Skin integrity: No ulcer, skin breakdown, erythema, warmth, callus or dry skin  Left foot:      Skin integrity: No ulcer, skin breakdown, erythema, warmth, callus or dry skin  Lymphadenopathy:      Cervical: No cervical adenopathy  Skin:     General: Skin is warm and dry  Findings: No erythema or rash  Neurological:      Mental Status: He is alert and oriented to person, place, and time  Deep Tendon Reflexes: Reflexes are normal and symmetric  Comments: Vibration sensation diminished to the first toe DIP joint bilaterally  Microfilament sensation intact bilaterally  achilles tendon reflexes diminished  Patient's shoes and socks removed  Right Foot/Ankle   Right Foot Inspection  Skin Exam: skin normal and skin intact  No dry skin, no warmth, no callus, no erythema, no maceration, no abnormal color, no pre-ulcer, no ulcer and no callus  Toe Exam: No swelling and  no right toe deformity    Sensory   Vibration: diminished  Monofilament testing: intact    Vascular  Capillary refills: < 3 seconds  The right DP pulse is 1+  The right PT pulse is 1+  Left Foot/Ankle  Left Foot Inspection  Skin Exam: skin normal and skin intact  No dry skin, no warmth, no erythema, no maceration, normal color, no pre-ulcer, no ulcer and no callus       Toe Exam: No swelling and no left toe deformity  Sensory   Vibration: diminished  Monofilament testing: intact    Vascular  Capillary refills: < 3 seconds  The left DP pulse is 1+  The left PT pulse is 1+  Assign Risk Category  No deformity present  Loss of protective sensation  Weak pulses  Risk: 2        The history was obtained from the review of the chart and from the patient      Lab Results:    Most recent Alc is  Lab Results   Component Value Date    HGBA1C 7 3 (H) 11/22/2022           Blood work done on 11/22/2022 showed a CMP with a glucose of 88 but was otherwise normal       Lab Results   Component Value Date    CREATININE 1 06 11/22/2022    CREATININE 1 00 06/28/2022    CREATININE 1 05 03/24/2022    BUN 16 11/22/2022    K 4 4 11/22/2022     11/22/2022    CO2 23 11/22/2022     eGFR   Date Value Ref Range Status   11/22/2022 75 >59 mL/min/1 73 Final         Lab Results   Component Value Date    HDL 57 03/24/2022    TRIG 123 03/24/2022       Lab Results   Component Value Date    ALT 17 11/22/2022    AST 20 11/22/2022       Lab Results   Component Value Date    TSH 1 510 03/24/2022             Future Appointments   Date Time Provider [place] Loving   8/10/2023  8:40 AM Tessa Edmonds MD [facility]

## 2023-03-01 DIAGNOSIS — E11.40 TYPE 2 DIABETES MELLITUS WITH DIABETIC NEUROPATHY, WITHOUT LONG-TERM CURRENT USE OF INSULIN (HCC): ICD-10-CM

## 2023-03-02 RX ORDER — BLOOD SUGAR DIAGNOSTIC
STRIP MISCELLANEOUS
Qty: 300 STRIP | Refills: 3 | Status: SHIPPED | OUTPATIENT
Start: 2023-03-02

## 2023-03-21 DIAGNOSIS — E11.40 TYPE 2 DIABETES MELLITUS WITH DIABETIC NEUROPATHY, WITHOUT LONG-TERM CURRENT USE OF INSULIN (HCC): ICD-10-CM

## 2023-04-28 DIAGNOSIS — E11.40 TYPE 2 DIABETES MELLITUS WITH DIABETIC NEUROPATHY, WITHOUT LONG-TERM CURRENT USE OF INSULIN (HCC): ICD-10-CM

## 2023-04-28 RX ORDER — BLOOD SUGAR DIAGNOSTIC
STRIP MISCELLANEOUS
Qty: 300 STRIP | Refills: 5 | Status: SHIPPED | OUTPATIENT
Start: 2023-04-28

## 2023-05-01 DIAGNOSIS — E11.40 TYPE 2 DIABETES MELLITUS WITH DIABETIC NEUROPATHY, WITHOUT LONG-TERM CURRENT USE OF INSULIN (HCC): ICD-10-CM

## 2023-05-01 RX ORDER — PIOGLITAZONEHYDROCHLORIDE 15 MG/1
15 TABLET ORAL DAILY
Qty: 90 TABLET | Refills: 1 | Status: SHIPPED | OUTPATIENT
Start: 2023-05-01

## 2023-06-15 DIAGNOSIS — E11.8 TYPE 2 DIABETES MELLITUS WITH COMPLICATION, WITHOUT LONG-TERM CURRENT USE OF INSULIN (HCC): ICD-10-CM

## 2023-06-29 LAB
CREAT ?TM UR-SCNC: 107.3 UMOL/L
EXT ALBUMIN URINE RANDOM: 10.6
HBA1C MFR BLD HPLC: 7.1 %
MICROALBUMIN/CREAT UR: 10 MG/G{CREAT}

## 2023-07-15 DIAGNOSIS — E11.40 TYPE 2 DIABETES MELLITUS WITH DIABETIC NEUROPATHY, WITHOUT LONG-TERM CURRENT USE OF INSULIN (HCC): ICD-10-CM

## 2023-07-17 RX ORDER — BLOOD SUGAR DIAGNOSTIC
STRIP MISCELLANEOUS
Qty: 300 STRIP | Refills: 2 | Status: SHIPPED | OUTPATIENT
Start: 2023-07-17

## 2023-07-21 DIAGNOSIS — E11.40 TYPE 2 DIABETES MELLITUS WITH DIABETIC NEUROPATHY, WITHOUT LONG-TERM CURRENT USE OF INSULIN (HCC): ICD-10-CM

## 2023-07-21 RX ORDER — INSULIN GLARGINE 100 [IU]/ML
INJECTION, SOLUTION SUBCUTANEOUS
Qty: 15 ML | Refills: 5 | Status: SHIPPED | OUTPATIENT
Start: 2023-07-21

## 2023-07-28 DIAGNOSIS — E78.2 MIXED HYPERLIPIDEMIA: ICD-10-CM

## 2023-07-31 RX ORDER — ATORVASTATIN CALCIUM 10 MG/1
TABLET, FILM COATED ORAL
Qty: 90 TABLET | Refills: 6 | Status: SHIPPED | OUTPATIENT
Start: 2023-07-31

## 2023-08-10 ENCOUNTER — OFFICE VISIT (OUTPATIENT)
Dept: ENDOCRINOLOGY | Facility: HOSPITAL | Age: 73
End: 2023-08-10
Payer: MEDICARE

## 2023-08-10 VITALS
BODY MASS INDEX: 35.19 KG/M2 | SYSTOLIC BLOOD PRESSURE: 142 MMHG | HEIGHT: 71 IN | HEART RATE: 86 BPM | DIASTOLIC BLOOD PRESSURE: 78 MMHG | WEIGHT: 251.4 LBS

## 2023-08-10 DIAGNOSIS — I10 PRIMARY HYPERTENSION: ICD-10-CM

## 2023-08-10 DIAGNOSIS — E78.2 MIXED HYPERLIPIDEMIA: ICD-10-CM

## 2023-08-10 DIAGNOSIS — E11.40 TYPE 2 DIABETES MELLITUS WITH DIABETIC NEUROPATHY, WITH LONG-TERM CURRENT USE OF INSULIN (HCC): Primary | ICD-10-CM

## 2023-08-10 DIAGNOSIS — Z79.4 TYPE 2 DIABETES MELLITUS WITH DIABETIC NEUROPATHY, WITH LONG-TERM CURRENT USE OF INSULIN (HCC): Primary | ICD-10-CM

## 2023-08-10 PROCEDURE — 99214 OFFICE O/P EST MOD 30 MIN: CPT | Performed by: INTERNAL MEDICINE

## 2023-08-10 RX ORDER — FERROUS SULFATE 325(65) MG
TABLET ORAL EVERY 24 HOURS
COMMUNITY
Start: 2023-07-10

## 2023-08-10 NOTE — PROGRESS NOTES
8/10/2023    Assessment/Plan      Diagnoses and all orders for this visit:    Type 2 diabetes mellitus with diabetic neuropathy, with long-term current use of insulin ([address])  -     HEMOGLOBIN A1C W/ EAG ESTIMATION Lab Collect; Future  -     Comprehensive metabolic panel Lab Collect; Future  -     CBC and differential Lab Collect; Future    Primary hypertension  -     HEMOGLOBIN A1C W/ EAG ESTIMATION Lab Collect; Future  -     Comprehensive metabolic panel Lab Collect; Future  -     CBC and differential Lab Collect; Future    Mixed hyperlipidemia  -     HEMOGLOBIN A1C W/ EAG ESTIMATION Lab Collect; Future  -     Comprehensive metabolic panel Lab Collect; Future  -     CBC and differential Lab Collect; Future    Other orders  -     ferrous sulfate 325 (65 Fe) mg tablet; every 24 hours        Assessment/Plan:  1. Type 2 diabetes. Hemoglobin A1c is 7.1%. This is excellent and improved for his age. For now, he will continue the same Lantus insulin and Fiasp insulin, glipizide, Invokana, metformin, pioglitazone, and Januvia. He will continue to test his blood sugars 3-4 times a day. We did discuss the possibility of a GLP-1 inhibitor such as Ozempic. If he decided to start this, we would discontinue the Januvia. He will think about it but at this point is going to continue the [address],. 2.  Diabetic neuropathy. He has unchanged neuropathic symptoms. Diabetic foot exams are up-to-date. 3.  Hypertension. His blood pressure is mildly elevated today in the office on his current dose of lisinopril/HCTZ but he will not change this at this time. 4.  Hyperlipidemia. He will continue the same atorvastatin 40 mg daily. I have asked him to follow-up in 6 months with preceding hemoglobin A1c, CMP, and CBC.       CC: Diabetes type II, blood pressure, lipid follow-up, insulin requiring with neuropathy    History of Present Illness     HPI: Mouna Reyes is a 81y.o. year old male with type 2 diabetes for 11 years, hypertension, hyperlipidemia for follow-up visit. He is on oral agents and insulin at home and takes Januvia 100 mg daily, pioglitazone 15 mg daily, metformin 1000 mg twice daily, Invokana 300 mg daily, Lantus insulin 60 units at bedtime, Fiasp insulin 9 units at lunch and 15 units at supper, and glipizide 5 mg 3 tablets in the morning and 1 tablet in the evening. He denies any polyuria, polydipsia, polyphagia, and blurry vision. He has occasional once a night nocturia. He has unchanged numbness and tingling of the feet. He denies chest pain but does have shortness of breath slightly when working outside or doing yard work. He denies nephropathy, retinopathy, heart attack, stroke and claudication but does admit to neuropathy. Hypoglycemic episodes: Yes occasional.    The patient's last eye exam was in October 2022 with no retinopathy. The patient's last foot exam was in February 2023 at endocrine office visit. Last A1C was   Lab Results   Component Value Date    HGBA1C 7.3 (H) 11/22/2022   . Blood Sugar/Glucometer/Pump/CGM review: checks blood sugars 3-4 times a day. Mostly 110-220 the rest of the day pre meals. Higher numbers after supper at bedtime usually due to higher carb meals. Before breakfast: usually around low 100s, was 78 this am.     He has hyperlipidemia and takes atorvastatin 10 mg daily. He denies chest pain. He does get somewhat short of breath at times when doing yard work. He has hypertension and takes lisinopril/HCTZ 10/12.5 mg daily. He denies headache or strokelike symptoms. Review of Systems   Constitutional: Negative for fatigue and unexpected weight change. Weight 2 pounds less than last visit. can easily fall asleep when idle in the chair. Eyes: Negative for visual disturbance. Wears glasses. Respiratory: Positive for shortness of breath. Negative for chest tightness. Slight SOB when working out side or doing yard work a bit. Cardiovascular: Negative for chest pain. Gastrointestinal: Negative for abdominal pain, constipation, diarrhea and nausea. Endocrine: Negative for polydipsia, polyphagia and polyuria. Nocturia occasionally once a night. Musculoskeletal: Positive for arthralgias. Left knee pain, still in a brace. Skin: Negative for wound. Neurological: Positive for numbness. Negative for dizziness, weakness, light-headedness and headaches. Still unchanged numbness and tingling of the feet. Hematological:        Has anemia, had colonoscopy this year and has polyps. Psychiatric/Behavioral: Positive for sleep disturbance. Wife had whipple procedure last week for pancreatic cancer. Will wake to check on wife at night. Stress with wife's illness.        Historical Information   Past Medical History:   Diagnosis Date   • Dupuytren's contracture of hand     bilateral   • GERD (gastroesophageal reflux disease)    • Migraine    • Osteoarthritis    • Seasonal allergies      Past Surgical History:   Procedure Laterality Date   • CATARACT EXTRACTION, BILATERAL Bilateral    • INGUINAL HERNIA REPAIR     • VASECTOMY       Social History   Social History     Substance and Sexual Activity   Alcohol Use No     Social History     Substance and Sexual Activity   Drug Use No     Social History     Tobacco Use   Smoking Status Never   Smokeless Tobacco Never     Family History:   Family History   Problem Relation Age of Onset   • Hypertension Mother    • Dementia Mother    • Diabetes type II Mother            • Lung cancer Father    • Diabetes type II Father            • Diabetes type II Sister    • Arthritis Sister    • Diabetes type II Brother         Type 2 Diabetic   • Diabetes unspecified Sister         prediabetes   • Arthritis Sister    • Hypertension Son    • Obesity Son        Meds/Allergies   Current Outpatient Medications   Medication Sig Dispense Refill   • Ascorbic Acid (VITAMIN C) 1000 MG tablet Take 1,000 mg by mouth daily     • aspirin 81 mg chewable tablet Chew 81 mg daily     • atorvastatin (LIPITOR) 10 mg tablet TAKE 1 TABLET BY MOUTH EVERY DAY 90 tablet 6   • Blood Glucose Monitoring Suppl (ONE TOUCH ULTRA 2) w/Device KIT Use once daily 1 each 0   • Cholecalciferol (Vitamin D) 125 MCG (5000 UT) CAPS Take by mouth daily     • ferrous sulfate 325 (65 Fe) mg tablet every 24 hours     • glipiZIDE (GLUCOTROL) 5 mg tablet TAKE 3 TABLETS BY MOUTH IN THE MORNING, AND 1 TABLET BY MOUTH IN THE EVENING. 360 tablet 3   • glucose blood (OneTouch Ultra) test strip USE AS DIRECTED 3 TIMES A  strip 2   • insulin aspart, w/niacinamide, (Fiasp FlexTouch) 100 Units/mL injection pen 7 units with lunch and 10 units with dinner. (Patient taking differently: 9 units with lunch and 15 units with dinner.) 30 mL 3   • Insulin Glargine Solostar (Lantus SoloStar) 100 UNIT/ML SOPN 6 units qhs 15 mL 5   • Insulin Pen Needle (BD Pen Needle Barbie 2nd Gen) 32G X 4 MM MISC Use 4 times daily 400 each 3   • Invokana 300 MG TABS TAKE 1 TABLET BY MOUTH EVERY DAY BEFORE BREAKFAST 90 tablet 1   • Lancets (OneTouch Delica Plus BFKFXP15S) MISC Test 3 times daily. E11.40 300 each 3   • lisinopril-hydrochlorothiazide (PRINZIDE,ZESTORETIC) 10-12.5 MG per tablet Take 1 tablet by mouth daily     • Loratadine-Pseudoephedrine (CLARITIN-D 12 HOUR PO) Take 1 tablet by mouth daily       • metFORMIN (GLUCOPHAGE) 1000 MG tablet TAKE 1 TABLET BY MOUTH TWICE A DAY WITH MEALS 180 tablet 1   • Multiple Vitamin (MULTI-VITAMIN DAILY PO) Take by mouth daily       • omeprazole (PriLOSEC) 20 mg delayed release capsule Take 20 mg by mouth daily     • pioglitazone (ACTOS) 15 mg tablet TAKE 1 TABLET (15 MG TOTAL) BY MOUTH DAILY. 90 tablet 1   • sitaGLIPtin (Januvia) 100 mg tablet Take 1 tablet (100 mg total) by mouth daily 90 tablet 3     No current facility-administered medications for this visit.      Allergies   Allergen Reactions   • Iron GI Intolerance     Upsets stomach       Objective   Vitals: Blood pressure 142/78, pulse 86, height 5' 11" (1.803 m), weight 114 kg (251 lb 6.4 oz). Invasive Devices     None                 Physical Exam  Vitals reviewed. Constitutional:       Appearance: Normal appearance. He is well-developed. HENT:      Head: Normocephalic and atraumatic. Eyes:      Conjunctiva/sclera: Conjunctivae normal.   Neck:      Thyroid: No thyromegaly. Vascular: No carotid bruit. Comments: Thyroid normal in size. Cardiovascular:      Rate and Rhythm: Normal rate and regular rhythm. Heart sounds: Normal heart sounds. No murmur heard. Pulmonary:      Effort: Pulmonary effort is normal.      Breath sounds: Normal breath sounds. No wheezing. Abdominal:      Palpations: Abdomen is soft. Musculoskeletal:         General: No deformity. Cervical back: Normal range of motion and neck supple. Right lower leg: No edema. Left lower leg: No edema. Lymphadenopathy:      Cervical: No cervical adenopathy. Skin:     General: Skin is warm and dry. Findings: No erythema or rash. Neurological:      Mental Status: He is alert and oriented to person, place, and time. Deep Tendon Reflexes: Reflexes are normal and symmetric. The history was obtained from the review of the chart and from the patient. Lab Results:     Blood work performed on 6/29/2023 showed a hemoglobin A1c of 7.1%. CBC shows hemoglobin of 10.7 with a hematocrit of 35.9 and low indices. CMP showed a glucose of 112 fasting but was otherwise normal.    Total cholesterol 146, triglyceride 82, HDL 65, LDL 65. Urine microalbumin to creatinine ratio is 10. TSH is 1.38. PSA is 1.5.      Most recent Alc is  Lab Results   Component Value Date    HGBA1C 7.3 (H) 11/22/2022               Lab Results   Component Value Date    CREATININE 1.06 11/22/2022    CREATININE 1.00 06/28/2022    CREATININE 1.05 03/24/2022    BUN 16 11/22/2022    K 4.4 11/22/2022     11/22/2022    CO2 23 11/22/2022     eGFR   Date Value Ref Range Status   11/22/2022 75 >59 mL/min/1.73 Final         Lab Results   Component Value Date    HDL 57 03/24/2022    TRIG 123 03/24/2022       Lab Results   Component Value Date    ALT 17 11/22/2022    AST 20 11/22/2022       Lab Results   Component Value Date    TSH 1.510 03/24/2022             Future Appointments   Date Time Provider [address]   2/12/2024  9:20 AM Sarthak Saucedo MD [facility]

## 2023-08-10 NOTE — PATIENT INSTRUCTIONS
Hgba1c is 7.1%. this is excellent and improved. No change in diabetes medications and insulin for now. Continue to test blood sugars 3-4 times a day. Follow up in 6 months with blood work. We can try ozempic in place of januvia in the future if needed.

## 2023-08-12 DIAGNOSIS — E11.40 TYPE 2 DIABETES MELLITUS WITH DIABETIC NEUROPATHY, WITHOUT LONG-TERM CURRENT USE OF INSULIN (HCC): ICD-10-CM

## 2023-08-12 DIAGNOSIS — Z79.4 TYPE 2 DIABETES MELLITUS WITH DIABETIC NEUROPATHY, WITH LONG-TERM CURRENT USE OF INSULIN (HCC): ICD-10-CM

## 2023-08-12 DIAGNOSIS — E11.40 TYPE 2 DIABETES MELLITUS WITH DIABETIC NEUROPATHY, WITH LONG-TERM CURRENT USE OF INSULIN (HCC): ICD-10-CM

## 2023-08-14 RX ORDER — LANCETS 33 GAUGE
EACH MISCELLANEOUS
Qty: 300 EACH | Refills: 3 | Status: SHIPPED | OUTPATIENT
Start: 2023-08-14

## 2023-08-14 NOTE — TELEPHONE ENCOUNTER
Anesthesia Post-op Note    Patient: Jack Chavez  Procedure(s) Performed: REMOVAL OF EXTERNAL VENTRICULAR DEVICE WITH STEALTH NAVIGATED INSERTION OF RIGHT VENTRICULOPERITONEAL SHUNT   ; REMOVAL OF EXTERNAL VENTRICULAR DEVICE WITH STEALTH NAVIGATED INSERTION OF RIGHT VENTRICULOPERITONEAL SHUNT     Anesthesia type: General    Vitals Value Taken Time   Temp 36 08/14/23 0950   Pulse 113 08/14/23 0950   Resp 24 08/14/23 0950   SpO2 99 08/14/23 0950   /87 08/14/23 0950         Patient Location: ICU  Post-op Vital Signs:stable  Cardiovascular stable  Hydration: euvolemic  Pain Management: adequately controlled  Handoff: Handoff to receiving clinician was performed and questions were answered  Vomiting: none  Nausea: None  Post-op Assessment: no complications, patient tolerated procedure well, dentition within defined limits and moving all extremities  Comments:   IVF: 400  UO: 30  EBL: 0      There were no known notable events for this encounter.   Spoke to patient   He notes that he does not always eat lunch

## 2023-08-31 DIAGNOSIS — E11.40 TYPE 2 DIABETES MELLITUS WITH DIABETIC NEUROPATHY, WITH LONG-TERM CURRENT USE OF INSULIN (HCC): ICD-10-CM

## 2023-08-31 DIAGNOSIS — Z79.4 TYPE 2 DIABETES MELLITUS WITH DIABETIC NEUROPATHY, WITH LONG-TERM CURRENT USE OF INSULIN (HCC): ICD-10-CM

## 2023-08-31 DIAGNOSIS — E11.40 TYPE 2 DIABETES MELLITUS WITH DIABETIC NEUROPATHY, WITHOUT LONG-TERM CURRENT USE OF INSULIN (HCC): ICD-10-CM

## 2023-08-31 RX ORDER — INSULIN ASPART INJECTION 100 [IU]/ML
INJECTION, SOLUTION SUBCUTANEOUS
Qty: 30 ML | Refills: 3 | Status: SHIPPED | OUTPATIENT
Start: 2023-08-31

## 2023-09-30 DIAGNOSIS — E11.65 TYPE 2 DIABETES MELLITUS WITH HYPERGLYCEMIA, WITHOUT LONG-TERM CURRENT USE OF INSULIN (HCC): ICD-10-CM

## 2023-10-02 RX ORDER — CANAGLIFLOZIN 300 MG/1
TABLET, FILM COATED ORAL
Qty: 90 TABLET | Refills: 1 | Status: SHIPPED | OUTPATIENT
Start: 2023-10-02

## 2023-10-09 DIAGNOSIS — E11.8 TYPE 2 DIABETES MELLITUS WITH COMPLICATION, WITHOUT LONG-TERM CURRENT USE OF INSULIN (HCC): ICD-10-CM

## 2023-10-09 RX ORDER — GLIPIZIDE 5 MG/1
TABLET ORAL
Qty: 360 TABLET | Refills: 3 | Status: SHIPPED | OUTPATIENT
Start: 2023-10-09

## 2023-10-26 LAB
LEFT EYE DIABETIC RETINOPATHY: NORMAL
RIGHT EYE DIABETIC RETINOPATHY: NORMAL

## 2023-11-29 DIAGNOSIS — E11.40 TYPE 2 DIABETES MELLITUS WITH DIABETIC NEUROPATHY, WITHOUT LONG-TERM CURRENT USE OF INSULIN (HCC): ICD-10-CM

## 2023-11-29 DIAGNOSIS — E11.8 TYPE 2 DIABETES MELLITUS WITH COMPLICATION, WITHOUT LONG-TERM CURRENT USE OF INSULIN (HCC): ICD-10-CM

## 2023-11-29 RX ORDER — PIOGLITAZONEHYDROCHLORIDE 15 MG/1
15 TABLET ORAL DAILY
Qty: 90 TABLET | Refills: 1 | Status: SHIPPED | OUTPATIENT
Start: 2023-11-29

## 2023-12-21 LAB
EST. AVERAGE GLUCOSE BLD GHB EST-MCNC: 157 MG/DL
HBA1C MFR BLD: 7.1 % (ref 4.8–5.6)

## 2024-01-08 DIAGNOSIS — Z79.4 TYPE 2 DIABETES MELLITUS WITH DIABETIC NEUROPATHY, WITH LONG-TERM CURRENT USE OF INSULIN (HCC): ICD-10-CM

## 2024-01-08 DIAGNOSIS — E11.40 TYPE 2 DIABETES MELLITUS WITH DIABETIC NEUROPATHY, WITH LONG-TERM CURRENT USE OF INSULIN (HCC): ICD-10-CM

## 2024-01-08 DIAGNOSIS — E11.40 TYPE 2 DIABETES MELLITUS WITH DIABETIC NEUROPATHY, WITHOUT LONG-TERM CURRENT USE OF INSULIN (HCC): ICD-10-CM

## 2024-01-08 RX ORDER — LANCETS 33 GAUGE
EACH MISCELLANEOUS
Qty: 300 EACH | Refills: 3 | Status: SHIPPED | OUTPATIENT
Start: 2024-01-08

## 2024-01-20 DIAGNOSIS — E11.40 TYPE 2 DIABETES MELLITUS WITH DIABETIC NEUROPATHY, WITHOUT LONG-TERM CURRENT USE OF INSULIN (HCC): ICD-10-CM

## 2024-01-22 RX ORDER — SITAGLIPTIN 100 MG/1
100 TABLET, FILM COATED ORAL DAILY
Qty: 90 TABLET | Refills: 3 | Status: SHIPPED | OUTPATIENT
Start: 2024-01-22

## 2024-03-02 DIAGNOSIS — E11.40 TYPE 2 DIABETES MELLITUS WITH DIABETIC NEUROPATHY, WITHOUT LONG-TERM CURRENT USE OF INSULIN (HCC): ICD-10-CM

## 2024-03-04 ENCOUNTER — OFFICE VISIT (OUTPATIENT)
Dept: ENDOCRINOLOGY | Facility: HOSPITAL | Age: 74
End: 2024-03-04
Payer: MEDICARE

## 2024-03-04 VITALS
HEIGHT: 71 IN | BODY MASS INDEX: 36.4 KG/M2 | WEIGHT: 260 LBS | HEART RATE: 79 BPM | DIASTOLIC BLOOD PRESSURE: 66 MMHG | OXYGEN SATURATION: 95 % | SYSTOLIC BLOOD PRESSURE: 128 MMHG

## 2024-03-04 DIAGNOSIS — E78.2 MIXED HYPERLIPIDEMIA: ICD-10-CM

## 2024-03-04 DIAGNOSIS — I10 PRIMARY HYPERTENSION: ICD-10-CM

## 2024-03-04 DIAGNOSIS — E66.01 OBESITY, MORBID (HCC): ICD-10-CM

## 2024-03-04 DIAGNOSIS — E11.40 TYPE 2 DIABETES MELLITUS WITH DIABETIC NEUROPATHY, WITH LONG-TERM CURRENT USE OF INSULIN (HCC): Primary | ICD-10-CM

## 2024-03-04 DIAGNOSIS — Z79.4 TYPE 2 DIABETES MELLITUS WITH DIABETIC NEUROPATHY, WITH LONG-TERM CURRENT USE OF INSULIN (HCC): Primary | ICD-10-CM

## 2024-03-04 PROCEDURE — 99214 OFFICE O/P EST MOD 30 MIN: CPT | Performed by: PHYSICIAN ASSISTANT

## 2024-03-04 NOTE — PATIENT INSTRUCTIONS
Monitor diet and maintain physical activity.     Call the office when you are out of Lantus and we will switch to Toujeo. Otherwise continue with same medications at this time.    Continue to check blood sugars 3-4 times a day.    Call with any concerns or questions.    Follow up in 6 months with lab work prior to visit.

## 2024-03-04 NOTE — PROGRESS NOTES
Rakan Pérez 73 y.o. male MRN: [id number]    Encounter: [id number]      Assessment/Plan     Assessment:  This is a 73 y.o.-year-old male with type 2 diabetes with neuropathy, hypertension and hyperlipidemia.    Plan:  1.  Type 2 diabetes: Most recent hemoglobin A1c 6.9.  Overall glucose levels are doing quite well.  At this time he will continue with Januvia 100 mg daily, pioglitazone 15 mg daily, metformin 1000 mg twice a day, Invokana 300 mg daily, Lantus 60 units at bedtime, and Fiasp 9 units with lunch and 15 units with dinner, glipizide 5 mg 3 tablets in the morning and 1 tablet in the evening.  Continue checking glucose levels 3 or more times a day.  Contact the office with any concerns or questions.  Continue with lifestyle modifications to help with proved glucose levels.  Contact the office with any concerns or questions.  Follow-up in 6 months with lab work completed prior to visit.    2.  Diabetic neuropathy: Stable.  Diabetic foot exam completed today.    3.  Hypertension: Normotensive in the office.  Kidney function remains stable with normal electrolytes.  Continue with lisinopril-HCTZ.  Repeat CMP prior to next office visit.    4.  Hyperlipidemia: Lipid panel doing well at this time.  Continue with atorvastatin 10 mg daily.  Repeat lipid panel prior to next office visit.    CC: Type 2 diabetes follow-up    History of Present Illness     HPI:  Rakan Pérez is a 73 year old male with type 2 diabetes for 12 years, hypertension, hyperlipidemia for follow-up visit.  He is on oral agents and insulin at home and takes Januvia 100 mg daily, pioglitazone 15 mg daily, metformin 1000 mg twice daily, Invokana 300 mg daily, Lantus insulin 60 units at bedtime, Fiasp insulin 9 units at lunch and 15 units at supper, and glipizide 5 mg 3 tablets in the morning and 1 tablet in the evening. He denies any polyuria, polydipsia, polyphagia, and blurry vision.  He has occasional once a night nocturia.  He has  unchanged numbness and tingling of the feet.  He denies chest pain but does have shortness of breath slightly when working outside or doing yard work.  He denies nephropathy, retinopathy, heart attack, stroke and claudication but does admit to neuropathy.  Overall doing well today.  States that he has been under a lot of stress and without will make poor dietary choices which leads to fluctuation in glucose levels.  Wife was diagnosed with pancreatic cancer and has gone through a lot of treatment.  Is doing well at this time but states life has he yet to return to normal.      Hypoglycemic episodes: Yes occasional.     The patient's last eye exam was in October 2022 with no retinopathy.  The patient's last foot exam was in February 2023 at endocrine office visit.  Most recent hemoglobin A1c completed December 21, 2023 was 7.1.     Blood Sugar/Glucometer/Pump/CGM review: checks blood sugars 3-4 times a day.  Fasting blood sugars are typically between 90 and 110.  Prior to lunch he is typically between 120 and 160.  Prior to dinner he is typically between 120 and 180.  And prior to bed he is typically between 130 and 190.  He does have a few episodes in the 200s.      He has hyperlipidemia and takes atorvastatin 10 mg daily.  He denies chest pain.  He does get somewhat short of breath at times when doing yard work.     He has hypertension and takes lisinopril/HCTZ 10/12.5 mg daily.  He denies headache or strokelike symptoms.     Review of Systems   Constitutional:  Negative for activity change, appetite change, fatigue and unexpected weight change.   HENT:  Negative for trouble swallowing.    Eyes:  Negative for visual disturbance.   Respiratory:  Negative for chest tightness and shortness of breath.    Cardiovascular:  Negative for chest pain, palpitations and leg swelling.   Gastrointestinal:  Negative for abdominal pain, diarrhea, nausea and vomiting.   Endocrine: Negative for cold intolerance, heat intolerance,  polydipsia, polyphagia and polyuria.   Genitourinary:  Negative for frequency.        Nocturia   Skin:  Negative for rash and wound.   Neurological:  Positive for numbness. Negative for dizziness, weakness, light-headedness and headaches.   Psychiatric/Behavioral:  Negative for dysphoric mood and sleep disturbance. The patient is not nervous/anxious.    Patient's shoes and socks removed.    Right Foot/Ankle   Right Foot Inspection  Skin Exam: skin normal, skin intact and abnormal color. No dry skin, no warmth, no callus, no erythema, no maceration, no pre-ulcer, no ulcer and no callus.     Toe Exam: ROM and strength within normal limits. No tenderness, erythema and  no right toe deformity    Sensory   Vibration: diminished  Proprioception: intact  Monofilament testing: diminished    Vascular  Capillary refills: < 3 seconds  The right DP pulse is 2+. The right PT pulse is 2+.     Left Foot/Ankle  Left Foot Inspection  Skin Exam: skin normal, skin intact and abnormal color. No dry skin, no warmth, no erythema, no maceration, no pre-ulcer, no ulcer and no callus.     Toe Exam: ROM and strength within normal limits. No tenderness, no erythema and no left toe deformity.     Sensory   Vibration: diminished  Proprioception: intact  Monofilament testing: diminished    Vascular  Capillary refills: < 3 seconds  The left DP pulse is 2+. The left PT pulse is 2+.     Assign Risk Category  Deformity present  Loss of protective sensation  No weak pulses  Risk: 2        Historical Information   Past Medical History:   Diagnosis Date   • Dupuytren's contracture of hand     bilateral   • GERD (gastroesophageal reflux disease)    • Migraine    • Osteoarthritis    • Seasonal allergies      Past Surgical History:   Procedure Laterality Date   • CATARACT EXTRACTION, BILATERAL Bilateral    • INGUINAL HERNIA REPAIR     • VASECTOMY       Social History   Social History     Substance and Sexual Activity   Alcohol Use No     Social History      Substance and Sexual Activity   Drug Use No     Social History     Tobacco Use   Smoking Status Never   Smokeless Tobacco Never     Family History:   Family History   Problem Relation Age of Onset   • Hypertension Mother    • Dementia Mother    • Diabetes type II Mother            • Lung cancer Father    • Diabetes type II Father            • Diabetes type II Sister    • Arthritis Sister    • Diabetes type II Brother         Type 2 Diabetic   • Diabetes unspecified Sister         prediabetes   • Arthritis Sister    • Hypertension Son    • Obesity Son        Meds/Allergies   Current Outpatient Medications   Medication Sig Dispense Refill   • Ascorbic Acid (VITAMIN C) 1000 MG tablet Take 1,000 mg by mouth daily     • aspirin 81 mg chewable tablet Chew 81 mg daily     • atorvastatin (LIPITOR) 10 mg tablet TAKE 1 TABLET BY MOUTH EVERY DAY 90 tablet 6   • Blood Glucose Monitoring Suppl (ONE TOUCH ULTRA 2) w/Device KIT Use once daily 1 each 0   • Cholecalciferol (Vitamin D) 125 MCG (5000 UT) CAPS Take by mouth daily     • ferrous sulfate 325 (65 Fe) mg tablet every 24 hours     • glipiZIDE (GLUCOTROL) 5 mg tablet TAKE 3 TABLETS BY MOUTH IN THE MORNING, AND 1 TABLET BY MOUTH IN THE EVENING. 360 tablet 3   • glucose blood (OneTouch Ultra) test strip USE AS DIRECTED 3 TIMES A  strip 2   • insulin aspart, w/niacinamide, (Fiasp FlexTouch) 100 Units/mL injection pen 9 units with lunch and 15 units with dinner. 30 mL 3   • Insulin Glargine Solostar (Lantus SoloStar) 100 UNIT/ML SOPN 6 units qhs 15 mL 5   • Insulin Pen Needle (BD Pen Needle Barbie 2nd Gen) 32G X 4 MM MISC Use 4 times daily 400 each 3   • Invokana 300 MG TABS TAKE 1 TABLET BY MOUTH EVERY DAY BEFORE BREAKFAST 90 tablet 1   • Januvia 100 MG tablet TAKE 1 TABLET BY MOUTH EVERY DAY 90 tablet 3   • Lancets (OneTouch Delica Plus [id number]) MISC USE TO TEST 3 TIMES A DAY **DX E11.40** 300 each 3   • lisinopril-hydrochlorothiazide  "(PRINZIDE,ZESTORETIC) 10-12.5 MG per tablet Take 1 tablet by mouth daily     • Loratadine-Pseudoephedrine (CLARITIN-D 12 HOUR PO) Take 1 tablet by mouth daily       • metFORMIN (GLUCOPHAGE) 1000 MG tablet TAKE 1 TABLET BY MOUTH TWICE A DAY WITH FOOD 180 tablet 1   • Multiple Vitamin (MULTI-VITAMIN DAILY PO) Take by mouth daily       • omeprazole (PriLOSEC) 20 mg delayed release capsule Take 20 mg by mouth daily     • pioglitazone (ACTOS) 15 mg tablet TAKE 1 TABLET (15 MG TOTAL) BY MOUTH DAILY. 90 tablet 1     No current facility-administered medications for this visit.     No Known Allergies      Objective   Vitals: Blood pressure 128/66, pulse 79, height 5' 11\" (1.803 m), weight 118 kg (260 lb), SpO2 95%.    Physical Exam  Vitals and nursing note reviewed.   Constitutional:       General: He is not in acute distress.     Appearance: Normal appearance. He is not diaphoretic.   HENT:      Head: Normocephalic and atraumatic.   Eyes:      General: No scleral icterus.     Extraocular Movements: Extraocular movements intact.      Conjunctiva/sclera: Conjunctivae normal.      Pupils: Pupils are equal, round, and reactive to light.   Cardiovascular:      Rate and Rhythm: Normal rate and regular rhythm.      Pulses: no weak pulses.           Dorsalis pedis pulses are 2+ on the right side and 2+ on the left side.        Posterior tibial pulses are 2+ on the right side and 2+ on the left side.      Heart sounds: No murmur heard.  Pulmonary:      Effort: Pulmonary effort is normal. No respiratory distress.      Breath sounds: Normal breath sounds. No wheezing.   Musculoskeletal:      Cervical back: Normal range of motion.   Feet:      Right foot:      Skin integrity: No ulcer, skin breakdown, erythema, warmth, callus or dry skin.      Left foot:      Skin integrity: No ulcer, skin breakdown, erythema, warmth, callus or dry skin.   Lymphadenopathy:      Cervical: No cervical adenopathy.   Skin:     General: Skin is warm and " "dry.   Neurological:      Mental Status: He is alert and oriented to person, place, and time. Mental status is at baseline.      Sensory: No sensory deficit.      Gait: Gait normal.   Psychiatric:         Mood and Affect: Mood normal.         Behavior: Behavior normal.         Thought Content: Thought content normal.         The history was obtained from the review of the chart, patient.    Lab Results:   Lab Results   Component Value Date/Time    Hemoglobin A1C 7.1 (H) 12/21/2023 08:55 AM    Hemoglobin A1C 7.1 06/29/2023 12:00 AM         Portions of the record may have been created with voice recognition software. Occasional wrong word or \"sound a like\" substitutions may have occurred due to the inherent limitations of voice recognition software. Read the chart carefully and recognize, using context, where substitutions have occurred.    "

## 2024-03-05 RX ORDER — BLOOD SUGAR DIAGNOSTIC
STRIP MISCELLANEOUS
Qty: 300 STRIP | Refills: 2 | Status: SHIPPED | OUTPATIENT
Start: 2024-03-05

## 2024-04-02 DIAGNOSIS — E11.40 TYPE 2 DIABETES MELLITUS WITH DIABETIC NEUROPATHY, WITHOUT LONG-TERM CURRENT USE OF INSULIN (HCC): ICD-10-CM

## 2024-04-03 RX ORDER — PIOGLITAZONEHYDROCHLORIDE 15 MG/1
15 TABLET ORAL DAILY
Qty: 90 TABLET | Refills: 1 | Status: SHIPPED | OUTPATIENT
Start: 2024-04-03

## 2024-04-15 ENCOUNTER — TELEPHONE (OUTPATIENT)
Age: 74
End: 2024-04-15

## 2024-04-18 NOTE — TELEPHONE ENCOUNTER
PA for Lantus    Submitted via    [x]CMM-KEY  [id number]  [][employer]-Case ID #    []Faxed to plan   []Other website    []Phone call Case ID #      Office notes sent, clinical questions answered. Awaiting determination    Turnaround time for your insurance to make a decision on your Prior Authorization can take 7-21 business days.

## 2024-04-19 DIAGNOSIS — Z79.4 TYPE 2 DIABETES MELLITUS WITH DIABETIC NEUROPATHY, WITH LONG-TERM CURRENT USE OF INSULIN (HCC): Primary | ICD-10-CM

## 2024-04-19 DIAGNOSIS — E11.40 TYPE 2 DIABETES MELLITUS WITH DIABETIC NEUROPATHY, WITH LONG-TERM CURRENT USE OF INSULIN (HCC): Primary | ICD-10-CM

## 2024-04-19 RX ORDER — INSULIN GLARGINE 100 [IU]/ML
6 INJECTION, SOLUTION SUBCUTANEOUS DAILY
Qty: 15 ML | Refills: 1 | Status: SHIPPED | OUTPATIENT
Start: 2024-04-19

## 2024-04-19 NOTE — TELEPHONE ENCOUNTER
PA for lantus Denied    Reason:       Message sent to office clinical pool Yes    Denial letter scanned into Media Yes    Appeal started No ( Provider will need to decide if appeal is warranted and send clinical documentation to PA team for initiation.)

## 2024-04-22 ENCOUNTER — TELEPHONE (OUTPATIENT)
Age: 74
End: 2024-04-22

## 2024-04-22 NOTE — TELEPHONE ENCOUNTER
Spoke to patient. The Basaglar was sent to take in the morning. He takes it at night and wanted to make sure it's ok to take it at night. I advised he can take his Basaglar as he normally does. I explained the difference between Dexcom and Harry. He is going to look into this and let us know if he wants to use one.

## 2024-04-22 NOTE — TELEPHONE ENCOUNTER
Patient called the RX Refill Line. Message is being forwarded to the office.     Patient is requesting his Basaglar Insulin instructions be clarified.  He has always taken his Lantus at night.  He wants to make sure now that he is switching it is still ok to take it at night.  And have instructions updated on his prescription.        He also is concidering Dexcom or Harry and would like to discuss this     Please contact patient at [phone]

## 2024-04-23 ENCOUNTER — TELEPHONE (OUTPATIENT)
Dept: ENDOCRINOLOGY | Facility: CLINIC | Age: 74
End: 2024-04-23

## 2024-04-23 NOTE — TELEPHONE ENCOUNTER
I was informed by staff that a correction was needed on a reported A1c for the patient. It resulted 7.1 but should read 6.9.     I reached out to [facility] regarding this correction from advise from our Chart Correction Team that [facility] would have to make this correction. Jaz explained they are unable to fix this correction as the specimen was ran twice due to having two different providers that ordered a A1c. The first time it was ran, it was 7.1 and the second time was a 6.9. She states that both are in the range and no corrections can be made.     Thanks,   Danna

## 2024-05-18 DIAGNOSIS — E11.8 TYPE 2 DIABETES MELLITUS WITH COMPLICATION, WITHOUT LONG-TERM CURRENT USE OF INSULIN (HCC): ICD-10-CM

## 2024-07-05 DIAGNOSIS — E11.65 TYPE 2 DIABETES MELLITUS WITH HYPERGLYCEMIA, WITHOUT LONG-TERM CURRENT USE OF INSULIN (HCC): ICD-10-CM

## 2024-07-05 RX ORDER — CANAGLIFLOZIN 300 MG/1
TABLET, FILM COATED ORAL
Qty: 90 TABLET | Refills: 1 | Status: SHIPPED | OUTPATIENT
Start: 2024-07-05

## 2024-07-17 DIAGNOSIS — E11.65 TYPE 2 DIABETES MELLITUS WITH HYPERGLYCEMIA, WITHOUT LONG-TERM CURRENT USE OF INSULIN (HCC): Primary | ICD-10-CM

## 2024-07-17 RX ORDER — INSULIN LISPRO 100 [IU]/ML
INJECTION, SOLUTION INTRAVENOUS; SUBCUTANEOUS
Qty: 15 ML | Refills: 2 | Status: SHIPPED | OUTPATIENT
Start: 2024-07-17 | End: 2024-07-24

## 2024-07-18 ENCOUNTER — TELEPHONE (OUTPATIENT)
Age: 74
End: 2024-07-18

## 2024-07-18 NOTE — TELEPHONE ENCOUNTER
Reason for call:   [] Refill   [x] Prior Auth  [] Other:     Office:   [] PCP/Provider - Toro Lopes  [x] Specialty/Provider -     Medication: Admelog solostar    Dose/Frequency:  9 unit daily with lunch 13 unitswith dinner    Quantity: 15 ml    Pharmacy: [facility]

## 2024-07-22 NOTE — TELEPHONE ENCOUNTER
Patient called and talked to me about his insulins and how none are covered through his insurance. He is out of medication now and he needs some guidance on what to do. I did inform him about the prior auth for the one insulin. Please call him at: [phone]

## 2024-07-22 NOTE — TELEPHONE ENCOUNTER
PA for insulin lispro (Admelog SoloStar) 100 units/mL injection pen     Submitted via    [x]CMM-KEY [email or web address] - PA Case ID: [id number]  []Cathy-Case ID #    []Faxed to plan   []Other website    []Phone call Case ID #      Office notes sent, clinical questions answered. Awaiting determination    Turnaround time for your insurance to make a decision on your Prior Authorization can take 7-21 business days.

## 2024-07-23 NOTE — TELEPHONE ENCOUNTER
PA for insulin lispro (Admelog SoloStar) 100 units/mL injection pen  Denied    Reason:      Message sent to office clinical pool Yes    Denial letter scanned into Media Yes    Appeal started No ( Provider will need to decide if appeal is warranted and send clinical documentation to Prior Authorization Team for initiation.)    **Please follow up with your patient regarding denial and next steps**

## 2024-07-24 ENCOUNTER — TELEPHONE (OUTPATIENT)
Age: 74
End: 2024-07-24

## 2024-07-24 ENCOUNTER — TELEPHONE (OUTPATIENT)
Dept: ENDOCRINOLOGY | Facility: HOSPITAL | Age: 74
End: 2024-07-24

## 2024-07-24 DIAGNOSIS — E11.65 TYPE 2 DIABETES MELLITUS WITH HYPERGLYCEMIA, WITHOUT LONG-TERM CURRENT USE OF INSULIN (HCC): Primary | ICD-10-CM

## 2024-07-24 RX ORDER — HUMAN INSULIN 100 [IU]/ML
INJECTION, SOLUTION SUBCUTANEOUS
Qty: 15 ML | Refills: 0 | Status: SHIPPED | OUTPATIENT
Start: 2024-07-24

## 2024-07-24 NOTE — TELEPHONE ENCOUNTER
He would like to know if he should continue the Basaglar and Lyumjev (sample pen) when he starts the Novolin R? He would like a response on mychart

## 2024-07-24 NOTE — TELEPHONE ENCOUNTER
Pharmacy comment: Alternative Requested:PRIOR AUTH REQUIRED.     Reason for call:   [] Refill   [x] Prior Auth  [] Other:     Office:   [] PCP/Provider -   [] Specialty/Provider - Endo    Medication:   Insulin Regular Human (NovoLIN R FlexPen) 100 UNIT/ML SOPN     Dose/Frequency:  As Directed     Quantity: 15 mL     Pharmacy: [facility]/pharm[facility] #[id number] - [place] - [address]      Does the patient have enough for 3 days?   [] Yes   [] No - Send as HP to POD

## 2024-07-25 NOTE — TELEPHONE ENCOUNTER
PA for Insulin Regular Human (NovoLIN R FlexPen) 100 UNIT/ML SOPN     Submitted via    [][email or web address]  []Surescripts-Case ID #    []Faxed to plan   []Other website    []Phone call Case ID #      Office notes sent, clinical questions answered. Awaiting determination    Turnaround time for your insurance to make a decision on your Prior Authorization can take 7-21 business days.

## 2024-08-12 DIAGNOSIS — E11.40 TYPE 2 DIABETES MELLITUS WITH DIABETIC NEUROPATHY, WITHOUT LONG-TERM CURRENT USE OF INSULIN (HCC): ICD-10-CM

## 2024-08-12 RX ORDER — PIOGLITAZONEHYDROCHLORIDE 15 MG/1
15 TABLET ORAL DAILY
Qty: 90 TABLET | Refills: 1 | Status: SHIPPED | OUTPATIENT
Start: 2024-08-12

## 2024-08-16 DIAGNOSIS — E11.8 TYPE 2 DIABETES MELLITUS WITH COMPLICATION, WITHOUT LONG-TERM CURRENT USE OF INSULIN (HCC): ICD-10-CM

## 2024-08-16 RX ORDER — GLIPIZIDE 5 MG/1
TABLET ORAL
Qty: 360 TABLET | Refills: 1 | Status: SHIPPED | OUTPATIENT
Start: 2024-08-16

## 2024-08-22 DIAGNOSIS — E11.40 TYPE 2 DIABETES MELLITUS WITH DIABETIC NEUROPATHY, WITHOUT LONG-TERM CURRENT USE OF INSULIN (HCC): ICD-10-CM

## 2024-08-22 DIAGNOSIS — E11.40 TYPE 2 DIABETES MELLITUS WITH DIABETIC NEUROPATHY, WITH LONG-TERM CURRENT USE OF INSULIN (HCC): ICD-10-CM

## 2024-08-22 DIAGNOSIS — Z79.4 TYPE 2 DIABETES MELLITUS WITH DIABETIC NEUROPATHY, WITH LONG-TERM CURRENT USE OF INSULIN (HCC): ICD-10-CM

## 2024-08-22 RX ORDER — INSULIN ASPART INJECTION 100 [IU]/ML
INJECTION, SOLUTION SUBCUTANEOUS
Qty: 30 ML | Refills: 3 | Status: SHIPPED | OUTPATIENT
Start: 2024-08-22

## 2024-08-24 DIAGNOSIS — E78.2 MIXED HYPERLIPIDEMIA: ICD-10-CM

## 2024-08-25 RX ORDER — ATORVASTATIN CALCIUM 10 MG/1
TABLET, FILM COATED ORAL
Qty: 90 TABLET | Refills: 1 | Status: SHIPPED | OUTPATIENT
Start: 2024-08-25

## 2024-09-04 LAB
ALBUMIN SERPL-MCNC: 4.1 G/DL (ref 3.8–4.8)
ALBUMIN/CREAT UR: 4 MG/G CREAT (ref 0–29)
ALP SERPL-CCNC: 64 IU/L (ref 44–121)
ALT SERPL-CCNC: 20 IU/L (ref 0–44)
AST SERPL-CCNC: 21 IU/L (ref 0–40)
BASOPHILS # BLD AUTO: 0.1 X10E3/UL (ref 0–0.2)
BASOPHILS NFR BLD AUTO: 1 %
BILIRUB SERPL-MCNC: 0.4 MG/DL (ref 0–1.2)
BUN SERPL-MCNC: 19 MG/DL (ref 8–27)
BUN/CREAT SERPL: 20 (ref 10–24)
CALCIUM SERPL-MCNC: 9.3 MG/DL (ref 8.6–10.2)
CHLORIDE SERPL-SCNC: 103 MMOL/L (ref 96–106)
CHOLEST SERPL-MCNC: 151 MG/DL (ref 100–199)
CO2 SERPL-SCNC: 23 MMOL/L (ref 20–29)
CREAT SERPL-MCNC: 0.96 MG/DL (ref 0.76–1.27)
CREAT UR-MCNC: 128.2 MG/DL
EGFR: 85 ML/MIN/1.73
EOSINOPHIL # BLD AUTO: 0.3 X10E3/UL (ref 0–0.4)
EOSINOPHIL NFR BLD AUTO: 4 %
ERYTHROCYTE [DISTWIDTH] IN BLOOD BY AUTOMATED COUNT: 13.6 % (ref 11.6–15.4)
GLOBULIN SER-MCNC: 2.6 G/DL (ref 1.5–4.5)
GLUCOSE SERPL-MCNC: 125 MG/DL (ref 70–99)
HBA1C MFR BLD: 7.5 % (ref 4.8–5.6)
HCT VFR BLD AUTO: 43.6 % (ref 37.5–51)
HDLC SERPL-MCNC: 60 MG/DL
HGB BLD-MCNC: 13.9 G/DL (ref 13–17.7)
IMM GRANULOCYTES # BLD: 0 X10E3/UL (ref 0–0.1)
IMM GRANULOCYTES NFR BLD: 0 %
LDLC SERPL CALC-MCNC: 74 MG/DL (ref 0–99)
LYMPHOCYTES # BLD AUTO: 2.1 X10E3/UL (ref 0.7–3.1)
LYMPHOCYTES NFR BLD AUTO: 25 %
MCH RBC QN AUTO: 28.5 PG (ref 26.6–33)
MCHC RBC AUTO-ENTMCNC: 31.9 G/DL (ref 31.5–35.7)
MCV RBC AUTO: 89 FL (ref 79–97)
MICROALBUMIN UR-MCNC: 5.1 UG/ML
MONOCYTES # BLD AUTO: 0.7 X10E3/UL (ref 0.1–0.9)
MONOCYTES NFR BLD AUTO: 9 %
NEUTROPHILS # BLD AUTO: 4.9 X10E3/UL (ref 1.4–7)
NEUTROPHILS NFR BLD AUTO: 61 %
PLATELET # BLD AUTO: 276 X10E3/UL (ref 150–450)
POTASSIUM SERPL-SCNC: 4.1 MMOL/L (ref 3.5–5.2)
PROT SERPL-MCNC: 6.7 G/DL (ref 6–8.5)
RBC # BLD AUTO: 4.88 X10E6/UL (ref 4.14–5.8)
SL AMB VLDL CHOLESTEROL CALC: 17 MG/DL (ref 5–40)
SODIUM SERPL-SCNC: 140 MMOL/L (ref 134–144)
TRIGL SERPL-MCNC: 93 MG/DL (ref 0–149)
TSH SERPL DL<=0.005 MIU/L-ACNC: 1.02 UIU/ML (ref 0.45–4.5)
WBC # BLD AUTO: 8.1 X10E3/UL (ref 3.4–10.8)

## 2024-09-10 ENCOUNTER — OFFICE VISIT (OUTPATIENT)
Dept: ENDOCRINOLOGY | Facility: HOSPITAL | Age: 74
End: 2024-09-10
Payer: MEDICARE

## 2024-09-10 VITALS
DIASTOLIC BLOOD PRESSURE: 70 MMHG | BODY MASS INDEX: 36.12 KG/M2 | HEART RATE: 84 BPM | WEIGHT: 258 LBS | HEIGHT: 71 IN | SYSTOLIC BLOOD PRESSURE: 124 MMHG

## 2024-09-10 DIAGNOSIS — Z79.4 TYPE 2 DIABETES MELLITUS WITH DIABETIC NEUROPATHY, WITH LONG-TERM CURRENT USE OF INSULIN (HCC): Primary | ICD-10-CM

## 2024-09-10 DIAGNOSIS — E11.40 TYPE 2 DIABETES MELLITUS WITH DIABETIC NEUROPATHY, WITH LONG-TERM CURRENT USE OF INSULIN (HCC): Primary | ICD-10-CM

## 2024-09-10 PROCEDURE — 99214 OFFICE O/P EST MOD 30 MIN: CPT | Performed by: PHYSICIAN ASSISTANT

## 2024-09-10 NOTE — PROGRESS NOTES
Rakan Pérez 74 y.o. male MRN: [id number]    Encounter: [id number]      Assessment & Plan     Assessment:  This is a 74 y.o.-year-old male with type 2 diabetes with neuropathy, hypertension and hyperlipidemia.    Plan:  1.  Type 2 diabetes: Most recent hemoglobin A1c 7.5.  Concern with glucose levels running a little bit higher.  At this time he will continue with Januvia 100 mg daily, pioglitazone 15 mg daily, metformin 1000 mg twice a day, Invokana 300 mg daily, and Fiasp 9 units with lunch and 15 units with dinner, glipizide 5 mg 3 tablets in the morning and 1 tablet in the evening.  I would like to increase his Lantus/Basaglar to 8 units daily.  Continue checking glucose levels 3 or more times a day.  Please contact the office if there is any concerns with hypoglycemia, as we will likely have to back off on his dinner dose if I asked.  Continue with lifestyle modifications to help with proved glucose levels.  Contact the office with any concerns or questions.  Follow-up in 6 months with lab work completed prior to visit.     2.  Diabetic neuropathy: Stable.  Diabetic foot exam completed today.     3.  Hypertension: Normotensive in the office.  Kidney function remains stable with normal electrolytes.  Continue with lisinopril-HCTZ.  Repeat CMP prior to next office visit.     4.  Hyperlipidemia: Lipid panel doing well at this time.  Continue with atorvastatin 10 mg daily.  Repeat lipid panel prior to next office visit.    CC: Type 2 diabetes follow-up    History of Present Illness     HPI:  Rakan Pérez is a 73 year old male with type 2 diabetes for 12 years, hypertension, hyperlipidemia for follow-up visit.  He is on oral agents and insulin at home and takes Januvia 100 mg daily, pioglitazone 15 mg daily, metformin 1000 mg twice daily, Invokana 300 mg daily, Lantus insulin 60 units at bedtime, Fiasp insulin 9 units at lunch and 15 units at supper, and glipizide 5 mg 3 tablets in the morning and 1 tablet in  the evening. He denies any polyuria, polydipsia, polyphagia, and blurry vision.  He has occasional once a night nocturia.  He has unchanged numbness and tingling of the feet.  He denies chest pain but does have shortness of breath slightly when working outside or doing yard work.  He denies nephropathy, retinopathy, heart attack, stroke and claudication but does admit to neuropathy.  Overall doing well today.  Fortunately was issues with getting his fiasp after last office visit and we switched him to Novolin R for a few months.  States that he noticed glucose levels were running higher when he was on this insulin.  After switching back he has been doing better, but is concerned about his overall A1c.  No other lifestyle modifications made since last office visit.      Hypoglycemic episodes: Yes occasional.     The patient's last eye exam was in October 2022 with no retinopathy.  The patient's last foot exam was in March 2024 at endocrine office visit.  Most recent hemoglobin A1c completed September 3, 2024 was 7.5.     Blood Sugar/Glucometer/Pump/CGM review: checks blood sugars 3-4 times a day.  Fasting blood sugars are typically between 90 and 110.  Prior to lunch he is typically between 140 and 190.  Prior to dinner he is typically between ranging anywhere in the 100s, and occasional episodes in the 200s.  And prior to bed he is typically high 100s.      He has hyperlipidemia and takes atorvastatin 10 mg daily.  He denies chest pain.  He does get somewhat short of breath at times when doing yard work.     He has hypertension and takes lisinopril/HCTZ 10/12.5 mg daily.  He denies headache or strokelike symptoms.     Review of Systems   Constitutional:  Negative for activity change, appetite change, fatigue and unexpected weight change.   HENT:  Negative for trouble swallowing.    Eyes:  Negative for visual disturbance.   Respiratory:  Negative for chest tightness and shortness of breath.    Cardiovascular:   Negative for chest pain, palpitations and leg swelling.   Gastrointestinal:  Negative for abdominal pain, diarrhea, nausea and vomiting.   Endocrine: Negative for cold intolerance, heat intolerance, polydipsia, polyphagia and polyuria.   Genitourinary:  Negative for frequency.        Nocturia   Skin:  Negative for rash and wound.   Neurological:  Positive for numbness. Negative for dizziness, weakness, light-headedness and headaches.   Psychiatric/Behavioral:  Negative for dysphoric mood and sleep disturbance. The patient is not nervous/anxious.        Historical Information   Past Medical History:   Diagnosis Date    Dupuytren's contracture of hand     bilateral    GERD (gastroesophageal reflux disease)     Migraine     Osteoarthritis     Seasonal allergies      Past Surgical History:   Procedure Laterality Date    CATARACT EXTRACTION, BILATERAL Bilateral     INGUINAL HERNIA REPAIR      VASECTOMY       Social History   Social History     Substance and Sexual Activity   Alcohol Use No     Social History     Substance and Sexual Activity   Drug Use No     Social History     Tobacco Use   Smoking Status Never   Smokeless Tobacco Never     Family History:   Family History   Problem Relation Age of Onset    Hypertension Mother     Dementia Mother     Diabetes type II Mother             Lung cancer Father     Diabetes type II Father             Diabetes type II Sister     Arthritis Sister     Diabetes type II Brother         Type 2 Diabetic    Diabetes unspecified Sister         prediabetes    Arthritis Sister     Hypertension Son     Obesity Son        Meds/Allergies   Current Outpatient Medications   Medication Sig Dispense Refill    Ascorbic Acid (VITAMIN C) 1000 MG tablet Take 1,000 mg by mouth daily      aspirin 81 mg chewable tablet Chew 81 mg daily      atorvastatin (LIPITOR) 10 mg tablet TAKE 1 TABLET BY MOUTH EVERY DAY 90 tablet 1    Blood Glucose Monitoring Suppl (ONE TOUCH ULTRA 2) w/Device KIT  Use once daily 1 each 0    Canagliflozin (Invokana) 300 MG TABS TAKE 1 TABLET EVERY DAY BEFORE BREAKFAST 90 tablet 1    Cholecalciferol (Vitamin D) 125 MCG (5000 UT) CAPS Take by mouth daily      ferrous sulfate 325 (65 Fe) mg tablet every 24 hours      glipiZIDE (GLUCOTROL) 5 mg tablet TAKE 3 TABLETS BY MOUTH IN THE MORNING & 1 TABLET IN THE EVENING 360 tablet 1    insulin aspart, w/niacinamide, (Fiasp FlexTouch) 100 Units/mL injection pen 9 units with lunch and 15 units with dinner. 30 mL 3    Insulin Glargine Solostar (Basaglar KwikPen) 100 UNIT/ML SOPN Inject 0.06 mL (6 Units total) under the skin in the morning 15 mL 1    Insulin Pen Needle (BD Pen Needle Barbie 2nd Gen) 32G X 4 MM MISC Use 4 times daily 400 each 3    Insulin Regular Human (NovoLIN R FlexPen) 100 UNIT/ML SOPN Use 8 units with lunch and 13 units with dinner. 15 mL 0    Januvia 100 MG tablet TAKE 1 TABLET BY MOUTH EVERY DAY 90 tablet 3    Lancets (OneTouch Delica Plus [id number]) MISC USE TO TEST 3 TIMES A DAY **DX E11.40** 300 each 3    lisinopril-hydrochlorothiazide (PRINZIDE,ZESTORETIC) 10-12.5 MG per tablet Take 1 tablet by mouth daily      Loratadine-Pseudoephedrine (CLARITIN-D 12 HOUR PO) Take 1 tablet by mouth daily        metFORMIN (GLUCOPHAGE) 1000 MG tablet TAKE 1 TABLET BY MOUTH TWICE A DAY WITH FOOD 180 tablet 1    Multiple Vitamin (MULTI-VITAMIN DAILY PO) Take by mouth daily        omeprazole (PriLOSEC) 20 mg delayed release capsule Take 20 mg by mouth daily      OneTouch Ultra test strip USE AS DIRECTED 3 TIMES A  strip 2    pioglitazone (ACTOS) 15 mg tablet TAKE 1 TABLET (15 MG TOTAL) BY MOUTH DAILY. 90 tablet 1     No current facility-administered medications for this visit.     No Known Allergies    Objective   Vitals: There were no vitals taken for this visit.    Physical Exam  Vitals and nursing note reviewed.   Constitutional:       General: He is not in acute distress.     Appearance: Normal appearance. He is not  diaphoretic.   HENT:      Head: Normocephalic and atraumatic.   Eyes:      General: No scleral icterus.     Extraocular Movements: Extraocular movements intact.      Conjunctiva/sclera: Conjunctivae normal.      Pupils: Pupils are equal, round, and reactive to light.   Cardiovascular:      Rate and Rhythm: Normal rate and regular rhythm.      Heart sounds: No murmur heard.  Pulmonary:      Effort: Pulmonary effort is normal. No respiratory distress.      Breath sounds: Normal breath sounds. No wheezing.   Musculoskeletal:      Cervical back: Normal range of motion.      Right lower leg: No edema.      Left lower leg: No edema.   Lymphadenopathy:      Cervical: No cervical adenopathy.   Skin:     General: Skin is warm and dry.   Neurological:      Mental Status: He is alert and oriented to person, place, and time. Mental status is at baseline.      Sensory: No sensory deficit.      Gait: Gait normal.   Psychiatric:         Mood and Affect: Mood normal.         Behavior: Behavior normal.         Thought Content: Thought content normal.         The history was obtained from the review of the chart, patient.    Lab Results:   Lab Results   Component Value Date/Time    Hemoglobin A1C 7.5 (H) 09/03/2024 09:28 AM    Hemoglobin A1C 7.1 (H) 12/21/2023 08:55 AM    White Blood Cell Count 8.1 09/03/2024 09:28 AM    Hemoglobin 13.9 09/03/2024 09:28 AM    HCT 43.6 09/03/2024 09:28 AM    MCV 89 09/03/2024 09:28 AM    Platelet Count 276 09/03/2024 09:28 AM    BUN 19 09/03/2024 09:28 AM    Potassium 4.1 09/03/2024 09:28 AM    Chloride 103 09/03/2024 09:28 AM    CO2 23 09/03/2024 09:28 AM    Creatinine 0.96 09/03/2024 09:28 AM    AST 21 09/03/2024 09:28 AM    ALT 20 09/03/2024 09:28 AM    Protein, Total 6.7 09/03/2024 09:28 AM    Albumin 4.1 09/03/2024 09:28 AM    Globulin, Total 2.6 09/03/2024 09:28 AM    HDL 60 09/03/2024 09:28 AM    Triglycerides 93 09/03/2024 09:28 AM         Portions of the record may have been created with  "voice recognition software. Occasional wrong word or \"sound a like\" substitutions may have occurred due to the inherent limitations of voice recognition software. Read the chart carefully and recognize, using context, where substitutions have occurred.    "

## 2024-09-10 NOTE — PATIENT INSTRUCTIONS
Monitor diet and maintain physical activity.      Increase Lantus/Basaglar to 8 units.    Otherwise continue with same medications at this time.     Continue to check blood sugars 3-4 times a day.     Call with any concerns or questions.     Follow up in 6 months with lab work prior to visit.

## 2024-10-19 DIAGNOSIS — E11.65 TYPE 2 DIABETES MELLITUS WITH HYPERGLYCEMIA, WITHOUT LONG-TERM CURRENT USE OF INSULIN (HCC): ICD-10-CM

## 2024-10-19 DIAGNOSIS — E11.8 TYPE 2 DIABETES MELLITUS WITH COMPLICATION, WITHOUT LONG-TERM CURRENT USE OF INSULIN (HCC): ICD-10-CM

## 2024-10-21 RX ORDER — CANAGLIFLOZIN 300 MG/1
TABLET, FILM COATED ORAL
Qty: 90 TABLET | Refills: 1 | Status: SHIPPED | OUTPATIENT
Start: 2024-10-21

## 2024-10-24 LAB
LEFT EYE DIABETIC RETINOPATHY: NORMAL
RIGHT EYE DIABETIC RETINOPATHY: NORMAL

## 2024-12-07 DIAGNOSIS — E11.40 TYPE 2 DIABETES MELLITUS WITH DIABETIC NEUROPATHY, WITH LONG-TERM CURRENT USE OF INSULIN (HCC): ICD-10-CM

## 2024-12-07 DIAGNOSIS — E11.40 TYPE 2 DIABETES MELLITUS WITH DIABETIC NEUROPATHY, WITHOUT LONG-TERM CURRENT USE OF INSULIN (HCC): ICD-10-CM

## 2024-12-07 DIAGNOSIS — Z79.4 TYPE 2 DIABETES MELLITUS WITH DIABETIC NEUROPATHY, WITH LONG-TERM CURRENT USE OF INSULIN (HCC): ICD-10-CM

## 2024-12-09 DIAGNOSIS — E78.2 MIXED HYPERLIPIDEMIA: ICD-10-CM

## 2024-12-09 RX ORDER — INSULIN ASPART INJECTION 100 [IU]/ML
INJECTION, SOLUTION SUBCUTANEOUS
Qty: 15 ML | Refills: 2 | Status: SHIPPED | OUTPATIENT
Start: 2024-12-09

## 2024-12-10 RX ORDER — ATORVASTATIN CALCIUM 10 MG/1
10 TABLET, FILM COATED ORAL DAILY
Qty: 90 TABLET | Refills: 1 | Status: SHIPPED | OUTPATIENT
Start: 2024-12-10

## 2024-12-14 DIAGNOSIS — Z79.4 TYPE 2 DIABETES MELLITUS WITH DIABETIC NEUROPATHY, WITH LONG-TERM CURRENT USE OF INSULIN (HCC): ICD-10-CM

## 2024-12-14 DIAGNOSIS — E11.40 TYPE 2 DIABETES MELLITUS WITH DIABETIC NEUROPATHY, WITH LONG-TERM CURRENT USE OF INSULIN (HCC): ICD-10-CM

## 2024-12-14 DIAGNOSIS — E11.40 TYPE 2 DIABETES MELLITUS WITH DIABETIC NEUROPATHY, WITHOUT LONG-TERM CURRENT USE OF INSULIN (HCC): ICD-10-CM

## 2024-12-16 RX ORDER — PEN NEEDLE, DIABETIC 32GX 5/32"
NEEDLE, DISPOSABLE MISCELLANEOUS 4 TIMES DAILY
Qty: 400 EACH | Refills: 1 | Status: SHIPPED | OUTPATIENT
Start: 2024-12-16

## 2025-01-27 DIAGNOSIS — E11.8 TYPE 2 DIABETES MELLITUS WITH COMPLICATION, WITHOUT LONG-TERM CURRENT USE OF INSULIN (HCC): ICD-10-CM

## 2025-01-27 DIAGNOSIS — E11.40 TYPE 2 DIABETES MELLITUS WITH DIABETIC NEUROPATHY, WITHOUT LONG-TERM CURRENT USE OF INSULIN (HCC): ICD-10-CM

## 2025-01-28 RX ORDER — GLIPIZIDE 5 MG/1
TABLET ORAL
Qty: 360 TABLET | Refills: 1 | Status: SHIPPED | OUTPATIENT
Start: 2025-01-28

## 2025-01-28 RX ORDER — PIOGLITAZONE 15 MG/1
15 TABLET ORAL DAILY
Qty: 90 TABLET | Refills: 1 | Status: SHIPPED | OUTPATIENT
Start: 2025-01-28

## 2025-01-28 RX ORDER — BLOOD SUGAR DIAGNOSTIC
STRIP MISCELLANEOUS 3 TIMES DAILY
Qty: 300 STRIP | Refills: 2 | Status: SHIPPED | OUTPATIENT
Start: 2025-01-28

## 2025-02-18 ENCOUNTER — RESULTS FOLLOW-UP (OUTPATIENT)
Dept: ENDOCRINOLOGY | Facility: HOSPITAL | Age: 75
End: 2025-02-18

## 2025-02-18 LAB
ALBUMIN SERPL-MCNC: 4.2 G/DL (ref 3.8–4.8)
ALP SERPL-CCNC: 64 IU/L (ref 44–121)
ALT SERPL-CCNC: 17 IU/L (ref 0–44)
AST SERPL-CCNC: 17 IU/L (ref 0–40)
BILIRUB SERPL-MCNC: 0.3 MG/DL (ref 0–1.2)
BUN SERPL-MCNC: 20 MG/DL (ref 8–27)
BUN/CREAT SERPL: 22 (ref 10–24)
CALCIUM SERPL-MCNC: 9.7 MG/DL (ref 8.6–10.2)
CHLORIDE SERPL-SCNC: 104 MMOL/L (ref 96–106)
CO2 SERPL-SCNC: 22 MMOL/L (ref 20–29)
CREAT SERPL-MCNC: 0.93 MG/DL (ref 0.76–1.27)
EGFR: 87 ML/MIN/1.73
GLOBULIN SER-MCNC: 2.3 G/DL (ref 1.5–4.5)
GLUCOSE SERPL-MCNC: 86 MG/DL (ref 70–99)
HBA1C MFR BLD: 7.4 % (ref 4.8–5.6)
POTASSIUM SERPL-SCNC: 4.1 MMOL/L (ref 3.5–5.2)
PROT SERPL-MCNC: 6.5 G/DL (ref 6–8.5)
SODIUM SERPL-SCNC: 141 MMOL/L (ref 134–144)

## 2025-02-25 ENCOUNTER — TELEPHONE (OUTPATIENT)
Dept: ENDOCRINOLOGY | Facility: HOSPITAL | Age: 75
End: 2025-02-25

## 2025-02-25 NOTE — TELEPHONE ENCOUNTER
Form is in my inbox.  I sent a delayed message to be sent to my inbox in 3 months to fill out paperwork.

## 2025-02-25 NOTE — TELEPHONE ENCOUNTER
I called the surgeon office. They just wanted us to give a heads up. She said it can be completed 2-4 weeks prior to surgery.

## 2025-02-26 DIAGNOSIS — E11.40 TYPE 2 DIABETES MELLITUS WITH DIABETIC NEUROPATHY, WITH LONG-TERM CURRENT USE OF INSULIN (HCC): Primary | ICD-10-CM

## 2025-02-26 DIAGNOSIS — Z79.4 TYPE 2 DIABETES MELLITUS WITH DIABETIC NEUROPATHY, WITH LONG-TERM CURRENT USE OF INSULIN (HCC): Primary | ICD-10-CM

## 2025-02-26 RX ORDER — INSULIN GLARGINE 100 [IU]/ML
6 INJECTION, SOLUTION SUBCUTANEOUS DAILY
Qty: 15 ML | Refills: 1 | Status: SHIPPED | OUTPATIENT
Start: 2025-02-26

## 2025-03-08 DIAGNOSIS — E11.40 TYPE 2 DIABETES MELLITUS WITH DIABETIC NEUROPATHY, WITHOUT LONG-TERM CURRENT USE OF INSULIN (HCC): ICD-10-CM

## 2025-03-10 RX ORDER — SITAGLIPTIN 100 MG/1
100 TABLET, FILM COATED ORAL DAILY
Qty: 90 TABLET | Refills: 1 | Status: SHIPPED | OUTPATIENT
Start: 2025-03-10

## 2025-03-12 ENCOUNTER — OFFICE VISIT (OUTPATIENT)
Dept: ENDOCRINOLOGY | Facility: HOSPITAL | Age: 75
End: 2025-03-12
Payer: MEDICARE

## 2025-03-12 VITALS
BODY MASS INDEX: 35.98 KG/M2 | SYSTOLIC BLOOD PRESSURE: 142 MMHG | HEART RATE: 84 BPM | DIASTOLIC BLOOD PRESSURE: 70 MMHG | HEIGHT: 71 IN | WEIGHT: 257 LBS | OXYGEN SATURATION: 98 %

## 2025-03-12 DIAGNOSIS — E11.40 TYPE 2 DIABETES MELLITUS WITH DIABETIC NEUROPATHY, WITH LONG-TERM CURRENT USE OF INSULIN (HCC): Primary | ICD-10-CM

## 2025-03-12 DIAGNOSIS — Z79.4 TYPE 2 DIABETES MELLITUS WITH DIABETIC NEUROPATHY, WITH LONG-TERM CURRENT USE OF INSULIN (HCC): Primary | ICD-10-CM

## 2025-03-12 DIAGNOSIS — I10 PRIMARY HYPERTENSION: ICD-10-CM

## 2025-03-12 DIAGNOSIS — E78.2 MIXED HYPERLIPIDEMIA: ICD-10-CM

## 2025-03-12 DIAGNOSIS — E66.01 OBESITY, MORBID (HCC): ICD-10-CM

## 2025-03-12 PROCEDURE — G2211 COMPLEX E/M VISIT ADD ON: HCPCS | Performed by: PHYSICIAN ASSISTANT

## 2025-03-12 PROCEDURE — 99214 OFFICE O/P EST MOD 30 MIN: CPT | Performed by: PHYSICIAN ASSISTANT

## 2025-03-12 RX ORDER — DAPAGLIFLOZIN 10 MG/1
10 TABLET, FILM COATED ORAL DAILY
Qty: 90 TABLET | Refills: 3 | Status: SHIPPED | OUTPATIENT
Start: 2025-03-12

## 2025-03-12 NOTE — ASSESSMENT & PLAN NOTE
Recent hemoglobin A1c has improved slightly.  Reviewing blood sugar logs to eat seem to be doing better than last office visit, so I hope his A1c continues to improve.  Does have the occasional higher blood sugars in the evening prior to bed, but during the day and fasting seems to be doing quite well.  At this time will not make any significant adjustments to his medication.  He will have the switch from Invokana to Farxiga, and Basaglar to Lantus due to change in insurance.  He will also likely have to switch his Fiasp in the future.  At this time he will can continue with Farxiga 10 mg daily, Lantus 6 units daily, Fiasp 9 units with lunch and 15 units with dinner, glipizide 5 mg 3 tablets in the morning and 1 tablet in the evening, Januvia 100 mg daily, pioglitazone 15 mg daily and metformin 1000 mg twice a day.  Continue checking blood sugars 3 more times a day.  Did express some interest in utilizing a Dexcom, so I gave him some information and he will also contact his insurance to see if this will be covered.  Contact the office with any concerns or questions.  Follow-up in 6 months with labwork completed prior to visit.  Lab Results   Component Value Date    HGBA1C 7.4 (H) 02/17/2025     Orders:  •  dapagliflozin (Farxiga) 10 MG tablet; Take 1 tablet (10 mg total) by mouth daily  •  Hemoglobin A1C; Future  •  Comprehensive metabolic panel; Future  •  Albumin / creatinine urine ratio; Future  •  Lipid panel; Future  •  TSH, 3rd generation with Free T4 reflex; Future

## 2025-03-12 NOTE — ASSESSMENT & PLAN NOTE
Normotensive in the office today.  Kidney function remained stable with normal electrolytes.  At this time he will continue with lisinopril does HCTZ 10-12.5 mg daily.  Repeat CMP prior to next office visit.

## 2025-03-12 NOTE — ASSESSMENT & PLAN NOTE
Previous lipid panel was excellent.  Continue with atorvastatin 10 mg daily.  Repeat lipid panel prior to next office visit.

## 2025-03-12 NOTE — PATIENT INSTRUCTIONS
Monitor diet and maintain physical activity.      Continue with same medications at this time.     Continue to check blood sugars 3-4 times a day.     Call with any concerns or questions.    Prior to surgery. Stop Farxiga 4 days prior to surgery. If not eating do not take mealtime insulin.     Follow up in 6 months with lab work prior to visit.

## 2025-03-12 NOTE — ASSESSMENT & PLAN NOTE
Slowly has been losing weight over time.  Continue with lifestyle modifications to help with weight loss.

## 2025-03-12 NOTE — PROGRESS NOTES
Name: Rakan Pérez      : 1950      MRN: [id number]  Encounter Provider: Toro Lopes PA-C  Encounter Date: 3/12/2025   Encounter department: [facility]    No chief complaint on file.  :  Assessment & Plan  Type 2 diabetes mellitus with diabetic neuropathy, with long-term current use of insulin (HCC)  Recent hemoglobin A1c has improved slightly.  Reviewing blood sugar logs to eat seem to be doing better than last office visit, so I hope his A1c continues to improve.  Does have the occasional higher blood sugars in the evening prior to bed, but during the day and fasting seems to be doing quite well.  At this time will not make any significant adjustments to his medication.  He will have the switch from Invokana to Farxiga, and Basaglar to Lantus due to change in insurance.  He will also likely have to switch his Fiasp in the future.  At this time he will can continue with Farxiga 10 mg daily, Lantus 6 units daily, Fiasp 9 units with lunch and 15 units with dinner, glipizide 5 mg 3 tablets in the morning and 1 tablet in the evening, Januvia 100 mg daily, pioglitazone 15 mg daily and metformin 1000 mg twice a day.  Continue checking blood sugars 3 more times a day.  Did express some interest in utilizing a Dexcom, so I gave him some information and he will also contact his insurance to see if this will be covered.  Contact the office with any concerns or questions.  Follow-up in 6 months with labwork completed prior to visit.  Lab Results   Component Value Date    HGBA1C 7.4 (H) 2025     Orders:  •  dapagliflozin (Farxiga) 10 MG tablet; Take 1 tablet (10 mg total) by mouth daily  •  Hemoglobin A1C; Future  •  Comprehensive metabolic panel; Future  •  Albumin / creatinine urine ratio; Future  •  Lipid panel; Future  •  TSH, 3rd generation with Free T4 reflex; Future    Primary hypertension  Normotensive in the office today.  Kidney function  remained stable with normal electrolytes.  At this time he will continue with lisinopril does HCTZ 10-12.5 mg daily.  Repeat CMP prior to next office visit.       Mixed hyperlipidemia  Previous lipid panel was excellent.  Continue with atorvastatin 10 mg daily.  Repeat lipid panel prior to next office visit.       Obesity, morbid (HCC)  Slowly has been losing weight over time.  Continue with lifestyle modifications to help with weight loss.           History of Present Illness     Rakan Pérez is a 74 y.o. male with type 2 diabetes for 12 years, hypertension, hyperlipidemia for follow-up visit.  He is on oral agents and insulin at home and takes Januvia 100 mg daily, pioglitazone 15 mg daily, metformin 1000 mg twice daily, Invokana 300 mg daily, Basaglar insulin 6 units at bedtime, Fiasp insulin 9 units at lunch and 15 units at supper, and glipizide 5 mg 3 tablets in the morning and 1 tablet in the evening. He denies any polyuria, polydipsia, polyphagia, and blurry vision.  He has occasional once a night nocturia.  He has unchanged numbness and tingling of the feet.  He denies chest pain but does have shortness of breath slightly when working outside or doing yard work.  He denies nephropathy, retinopathy, heart attack, stroke and claudication but does admit to neuropathy.  Overall doing well today.  Had a change in insurance at the beginning of the year, so we will have to switch some of his medications which include Basaglar, Fiasp, Invokana.  States he will need a refill for his Basaglar and Invokana soon.  He is also scheduled for knee surgery June 2025.  Paperwork was already faxed to our office and will need to be filled out a month prior to his surgery.  Did also express some interest in utilizing a Dexcom.     Hypoglycemic episodes: Yes occasional.     The patient's last eye exam was in October 2022 with no retinopathy.  The patient's last foot exam was in March 2024 at endocrine office visit.  Most recent  hemoglobin A1c completed February 17, 2025 was 7.4.     Blood Sugar/Glucometer/Pump/CGM review: checks blood sugars 3-4 times a day.  Fasting blood sugars are typically between 90 and 110.  Prior to lunch he is typically between 110 and 160.  Prior to dinner he is typically between ranging between 110 to 140, and occasional episodes in the 200s.  And prior to bed he is typically mid to high 100s.      He has hyperlipidemia and takes atorvastatin 10 mg daily.  He denies chest pain.  He does get somewhat short of breath at times when doing yard work.     He has hypertension and takes lisinopril/HCTZ 10/12.5 mg daily.  He denies headache or strokelike symptoms.    Current regimen:   Basaglar 6 units daily, Fiasp 9 units with lunch and 15 units with dinner, glipizide 5 mg 3 tablets in the morning and 1 tablet in the evening, metformin 1000 mg twice a day, Januvia 100 mg daily, Actos 15 mg daily.      Last Eye Exam: 10/24/2024  Last Foot Exam: 03/04/2024  Health Maintenance   Topic Date Due   • Diabetic Foot Exam  03/12/2026   • Diabetic Eye Exam  10/24/2026           Review of Systems   Constitutional:  Negative for activity change, appetite change, fatigue and unexpected weight change.   HENT:  Negative for trouble swallowing.    Eyes:  Negative for visual disturbance.   Respiratory:  Negative for chest tightness and shortness of breath.    Cardiovascular:  Negative for chest pain, palpitations and leg swelling.   Gastrointestinal:  Negative for abdominal pain, diarrhea, nausea and vomiting.   Endocrine: Negative for cold intolerance, heat intolerance, polydipsia, polyphagia and polyuria.   Genitourinary:  Negative for frequency.        Nocturia   Skin:  Negative for rash and wound.   Neurological:  Positive for numbness. Negative for dizziness, weakness, light-headedness and headaches.   Psychiatric/Behavioral:  Negative for dysphoric mood and sleep disturbance. The patient is not nervous/anxious.     as per  HPI    Medical History Reviewed by provider this encounter:     .  Current Outpatient Medications on File Prior to Visit   Medication Sig Dispense Refill   • Ascorbic Acid (VITAMIN C) 1000 MG tablet Take 1,000 mg by mouth daily     • aspirin 81 mg chewable tablet Chew 81 mg daily     • atorvastatin (LIPITOR) 10 mg tablet Take 1 tablet (10 mg total) by mouth daily 90 tablet 1   • Blood Glucose Monitoring Suppl (ONE TOUCH ULTRA 2) w/Device KIT Use once daily 1 each 0   • Cholecalciferol (Vitamin D) 125 MCG (5000 UT) CAPS Take by mouth daily     • ferrous sulfate 325 (65 Fe) mg tablet every 24 hours     • glipiZIDE (GLUCOTROL) 5 mg tablet TAKE 3 TABLETS BY MOUTH IN THE MORNING & 1 TABLET IN THE EVENING 360 tablet 1   • insulin aspart, w/niacinamide, (Fiasp FlexTouch) 100 Units/mL injection pen INJECT 9 UNITS WITH LUNCH AND 15 UNITS WITH DINNER. 15 mL 2   • Insulin Glargine Solostar (Lantus SoloStar) 100 UNIT/ML SOPN Inject 0.06 mL (6 Units total) under the skin in the morning 15 mL 1   • Insulin Pen Needle (BD Pen Needle Barbie 2nd Gen) 32G X 4 MM MISC USE 4 TIMES A  each 1   • Lancets (OneTouch Delica Plus [id number]) MISC USE TO TEST 3 TIMES A DAY **DX E11.40** 300 each 3   • lisinopril-hydrochlorothiazide (PRINZIDE,ZESTORETIC) 10-12.5 MG per tablet Take 1 tablet by mouth daily     • Loratadine-Pseudoephedrine (CLARITIN-D 12 HOUR PO) Take 1 tablet by mouth daily       • metFORMIN (GLUCOPHAGE) 1000 MG tablet TAKE 1 TABLET BY MOUTH TWICE A DAY WITH FOOD 180 tablet 1   • Multiple Vitamin (MULTI-VITAMIN DAILY PO) Take by mouth daily       • omeprazole (PriLOSEC) 20 mg delayed release capsule Take 20 mg by mouth daily     • OneTouch Ultra test strip USE AS DIRECTED 3 TIMES A  strip 2   • pioglitazone (ACTOS) 15 mg tablet TAKE 1 TABLET (15 MG TOTAL) BY MOUTH DAILY. 90 tablet 1   • sitaGLIPtin (Januvia) 100 mg tablet TAKE 1 TABLET BY MOUTH EVERY DAY 90 tablet 1   • [DISCONTINUED] Insulin Glargine Solostar  "(Basaglar KwikPen) 100 UNIT/ML SOPN Inject 0.06 mL (6 Units total) under the skin in the morning 15 mL 1   • [DISCONTINUED] Invokana 300 MG TABS TAKE 1 TABLET EVERY DAY BEFORE BREAKFAST 90 tablet 1     No current facility-administered medications on file prior to visit.      Social History     Tobacco Use   • Smoking status: Never   • Smokeless tobacco: Never   Vaping Use   • Vaping status: Never Used   Substance and Sexual Activity   • Alcohol use: No   • Drug use: No   • Sexual activity: Yes     Partners: Female     Birth control/protection: None        Medical History Reviewed by provider this encounter:     .    Objective   /70   Pulse 84   Ht 5' 11\" (1.803 m)   Wt 117 kg (257 lb)   SpO2 98%   BMI 35.84 kg/m²      Body mass index is 35.84 kg/m².  Wt Readings from Last 3 Encounters:   03/12/25 117 kg (257 lb)   09/10/24 117 kg (258 lb)   03/04/24 118 kg (260 lb)     Physical Exam  Vitals and nursing note reviewed.   Constitutional:       General: He is not in acute distress.     Appearance: Normal appearance. He is well-developed. He is not diaphoretic.   Eyes:      General: No scleral icterus.     Extraocular Movements: Extraocular movements intact.      Conjunctiva/sclera: Conjunctivae normal.      Pupils: Pupils are equal, round, and reactive to light.   Cardiovascular:      Rate and Rhythm: Normal rate and regular rhythm.      Pulses: Normal pulses. no weak pulses.           Dorsalis pedis pulses are 2+ on the right side and 2+ on the left side.        Posterior tibial pulses are 2+ on the right side and 2+ on the left side.      Heart sounds: Normal heart sounds. No murmur heard.     No friction rub. No gallop.   Pulmonary:      Effort: Pulmonary effort is normal. No tachypnea, bradypnea or respiratory distress.      Breath sounds: Normal breath sounds. No wheezing.   Musculoskeletal:      Cervical back: Normal range of motion.      Right lower leg: No edema.      Left lower leg: No edema.   Feet: "      Right foot:      Skin integrity: No ulcer, skin breakdown, erythema, warmth, callus or dry skin.      Left foot:      Skin integrity: No ulcer, skin breakdown, erythema, warmth, callus or dry skin.   Lymphadenopathy:      Cervical: No cervical adenopathy.   Skin:     General: Skin is warm and dry.   Neurological:      Mental Status: He is alert and oriented to person, place, and time. Mental status is at baseline. He is not disoriented.      Motor: No abnormal muscle tone.      Gait: Gait normal.      Deep Tendon Reflexes: Reflexes are normal and symmetric.   Psychiatric:         Mood and Affect: Mood normal.         Behavior: Behavior normal.         Thought Content: Thought content normal.     Patient's shoes and socks removed.    Right Foot/Ankle   Right Foot Inspection  Skin Exam: skin normal and skin intact. No dry skin, no warmth, no callus, no erythema, no maceration, no abnormal color, no pre-ulcer, no ulcer and no callus.     Toe Exam: ROM and strength within normal limits and swelling. No tenderness, erythema and  no right toe deformity    Sensory   Proprioception: intact  Monofilament testing: diminished    Vascular  Capillary refills: < 3 seconds  The right DP pulse is 2+. The right PT pulse is 2+.     Left Foot/Ankle  Left Foot Inspection  Skin Exam: skin normal and skin intact. No dry skin, no warmth, no erythema, no maceration, normal color, no pre-ulcer, no ulcer and no callus.     Toe Exam: ROM and strength within normal limits and swelling. No tenderness, no erythema and no left toe deformity.     Sensory   Proprioception: intact  Monofilament testing: diminished    Vascular  Capillary refills: < 3 seconds  The left DP pulse is 2+. The left PT pulse is 2+.     Assign Risk Category  No deformity present  Loss of protective sensation  No weak pulses  Risk: 1       Labs:   Lab Results   Component Value Date    HGBA1C 7.4 (H) 02/17/2025    HGBA1C 7.5 (H) 09/03/2024    HGBA1C 7.1 (H) 12/21/2023  "    Lab Results   Component Value Date    CREATININE 0.93 02/17/2025    CREATININE 0.96 09/03/2024    CREATININE 1.06 11/22/2022    BUN 20 02/17/2025    K 4.1 02/17/2025     02/17/2025    CO2 22 02/17/2025     eGFR   Date Value Ref Range Status   02/17/2025 87 >59 mL/min/1.73 Final     Lab Results   Component Value Date    HDL 60 09/03/2024    TRIG 93 09/03/2024     Lab Results   Component Value Date    ALT 17 02/17/2025    AST 17 02/17/2025     No results found for: \"[id number]\"    Patient Instructions   Monitor diet and maintain physical activity.      Continue with same medications at this time.     Continue to check blood sugars 3-4 times a day.     Call with any concerns or questions.    Prior to surgery. Stop Farxiga 4 days prior to surgery. If not eating do not take mealtime insulin.     Follow up in 6 months with lab work prior to visit.    Discussed with the patient and all questioned fully answered. He will call me if any problems arise.      "

## 2025-04-02 DIAGNOSIS — E11.8 TYPE 2 DIABETES MELLITUS WITH COMPLICATION, WITHOUT LONG-TERM CURRENT USE OF INSULIN (HCC): ICD-10-CM

## 2025-04-23 DIAGNOSIS — E11.40 TYPE 2 DIABETES MELLITUS WITH DIABETIC NEUROPATHY, WITH LONG-TERM CURRENT USE OF INSULIN (HCC): ICD-10-CM

## 2025-04-23 DIAGNOSIS — E11.40 TYPE 2 DIABETES MELLITUS WITH DIABETIC NEUROPATHY, WITHOUT LONG-TERM CURRENT USE OF INSULIN (HCC): ICD-10-CM

## 2025-04-23 DIAGNOSIS — Z79.4 TYPE 2 DIABETES MELLITUS WITH DIABETIC NEUROPATHY, WITH LONG-TERM CURRENT USE OF INSULIN (HCC): ICD-10-CM

## 2025-04-23 RX ORDER — INSULIN LISPRO 100 [IU]/ML
INJECTION, SOLUTION INTRAVENOUS; SUBCUTANEOUS
Refills: 0 | OUTPATIENT
Start: 2025-04-23

## 2025-05-13 DIAGNOSIS — E11.8 TYPE 2 DIABETES MELLITUS WITH COMPLICATION, WITHOUT LONG-TERM CURRENT USE OF INSULIN (HCC): ICD-10-CM

## 2025-05-27 ENCOUNTER — DOCUMENTATION (OUTPATIENT)
Dept: ENDOCRINOLOGY | Facility: HOSPITAL | Age: 75
End: 2025-05-27

## 2025-06-05 DIAGNOSIS — E11.40 TYPE 2 DIABETES MELLITUS WITH DIABETIC NEUROPATHY, WITH LONG-TERM CURRENT USE OF INSULIN (HCC): ICD-10-CM

## 2025-06-05 DIAGNOSIS — Z79.4 TYPE 2 DIABETES MELLITUS WITH DIABETIC NEUROPATHY, WITH LONG-TERM CURRENT USE OF INSULIN (HCC): ICD-10-CM

## 2025-06-05 RX ORDER — INSULIN GLARGINE 100 [IU]/ML
6 INJECTION, SOLUTION SUBCUTANEOUS DAILY
Qty: 15 ML | Refills: 1 | Status: SHIPPED | OUTPATIENT
Start: 2025-06-05

## 2025-06-28 DIAGNOSIS — E11.8 TYPE 2 DIABETES MELLITUS WITH COMPLICATION, WITHOUT LONG-TERM CURRENT USE OF INSULIN (HCC): ICD-10-CM

## 2025-06-30 RX ORDER — GLIPIZIDE 5 MG/1
TABLET ORAL
Qty: 360 TABLET | Refills: 1 | Status: SHIPPED | OUTPATIENT
Start: 2025-06-30

## 2025-07-22 ENCOUNTER — OFFICE VISIT (OUTPATIENT)
Age: 75
End: 2025-07-22
Payer: MEDICARE

## 2025-07-22 DIAGNOSIS — Z96.652 S/P TKR (TOTAL KNEE REPLACEMENT), LEFT: Primary | ICD-10-CM

## 2025-07-22 PROCEDURE — 97110 THERAPEUTIC EXERCISES: CPT

## 2025-07-22 NOTE — PROGRESS NOTES
Daily Note     Today's date: 2025  Patient name: Rakan Pérez  : 1950  MRN: [id number]  Referring provider: Flakita Caldwell PA-C  Encounter Diagnosis   Name Primary?    S/P TKR (total knee replacement), left Yes                  Subjective: Rakan reports that he had a follow up at surgeon's office, steri-strips removed, all is progressing well, next follow up in 1 month.  Pt denies any new complaints.      Objective: See treatment diary below  Ambulated 150 feet without device, 50 feet with device with supervision--intermittent cues to increase terminal knee extension, presents with upright posture and more symmetrical step lengths, decreased heel toe pattern left LE    Assessment:  requires max cues for correction of deviations with TE, pt is able to modify and demonstrate increased terminal knee extension with step ups and decreased use of UE with sit to stands; demonstrates more upright posture with standing/ambulation      Plan: Continue per plan of care.  Assess carry over with sit to stands at home and sitting on elevated surfaces, progress rom and strength        Precautions: DM, HTN, memory deficits      Treatment Diary 25            Manual Therapy                                                                 Neuro Re-Ed                                                                                                        Ther Ex             Recumbent bike Toggle, 5 min            Forward step ups Step+2 risers, 10 reps            Lateral step ups Step+2 risers, 10 reps            Tandem stance  15 sec, 2 reps each            Forward step ups and over Step, 10 reps            Sit to stands from chair 2 airex, 10 reps            Standing terminal knee extension, toe raises 10 sec, 10 reps                         Ther Activity                                       Gait Training                                       Modalities

## 2025-07-25 ENCOUNTER — OFFICE VISIT (OUTPATIENT)
Age: 75
End: 2025-07-25
Payer: MEDICARE

## 2025-07-25 DIAGNOSIS — Z96.652 S/P TKR (TOTAL KNEE REPLACEMENT), LEFT: Primary | ICD-10-CM

## 2025-07-25 PROCEDURE — 97140 MANUAL THERAPY 1/> REGIONS: CPT

## 2025-07-25 PROCEDURE — 97110 THERAPEUTIC EXERCISES: CPT

## 2025-07-25 NOTE — PROGRESS NOTES
Daily Note     Today's date: 2025  Patient name: Rakan Pérez  : 1950  MRN: [id number]  Referring provider: Flakita Caldwell PA-C  Dx: No diagnosis found.             Subjective: Pt notes he did a lot of walking yesterday around stores, he was holding onto a cart and did use SPC intermittently.  He notes feeling very sore today.   Spouse notes they have not modified chair at home with pillows to elevate height to increase ease of transfers.       Objective: See treatment diary below  HEP: written handout provided for knee extension and knee flexion stretch, to perform 3 times a day, instructed in performance of rolling pin myofascial release to distal quad      Assessment:  Educated pt and spouse at length regarding benefit of use of SPC to help normalize gait and decrease trunk flexion, knee flexion, recommended continued use of device for longer distance ambulation to increase knee mobility and strength.  Pt and spouse noted understanding.  Emphasis on HEP including self knee extension and flexion stretches 3 times a day, and to use left LE properly with function including sit to stand transfers with decreased use of UE.    Plan:  Continue with manual treatment to increase rom as able, possible patellar taping to decrease pain, progress strength     Precautions: DM, HTN, memory deficits      Treatment Diary 25          Visit number 1 2          Manual Therapy  AROM  -  supine          Patellar mobs knee extended  Grade III superior, medial, inferior          Patellar mobs knee flexed  Grade III inferior          Joint mobs Anterior to posterior at proximal tibia  Prone  Grade II-III          Release to articularis genu  5 reps          Neuro Re-Ed                                                                                                Ther Ex            Recumbent bike Toggle, 5 min Toggle  5 min          Forward step ups Step+2 risers, 10 reps           Lateral step ups  Step+2 risers, 10 reps           Tandem stance  15 sec, 2 reps each           Forward step ups and over Step, 10 reps           Sit to stands from chair 2 airex, 10 reps From bed table 5 reps, chair without arm rests 5 reps          Standing terminal knee extension, toe raises 10 sec, 10 reps 10 sec,  10 reps          Sled pushes  35 lbs, 50 feet times 4          Contract relax hamstrings  Prone, 8 sec, 4 reps                                  Gait Training            Ther Activity                        Modalities

## 2025-07-28 ENCOUNTER — OFFICE VISIT (OUTPATIENT)
Age: 75
End: 2025-07-28
Payer: MEDICARE

## 2025-07-28 DIAGNOSIS — Z96.652 S/P TKR (TOTAL KNEE REPLACEMENT), LEFT: Primary | ICD-10-CM

## 2025-07-28 PROCEDURE — 97110 THERAPEUTIC EXERCISES: CPT

## 2025-07-30 DIAGNOSIS — E11.40 TYPE 2 DIABETES MELLITUS WITH DIABETIC NEUROPATHY, WITH LONG-TERM CURRENT USE OF INSULIN (HCC): ICD-10-CM

## 2025-07-30 DIAGNOSIS — E11.40 TYPE 2 DIABETES MELLITUS WITH DIABETIC NEUROPATHY, WITHOUT LONG-TERM CURRENT USE OF INSULIN (HCC): ICD-10-CM

## 2025-07-30 DIAGNOSIS — Z79.4 TYPE 2 DIABETES MELLITUS WITH DIABETIC NEUROPATHY, WITH LONG-TERM CURRENT USE OF INSULIN (HCC): ICD-10-CM

## 2025-07-31 ENCOUNTER — OFFICE VISIT (OUTPATIENT)
Age: 75
End: 2025-07-31
Payer: MEDICARE

## 2025-07-31 ENCOUNTER — TELEPHONE (OUTPATIENT)
Age: 75
End: 2025-07-31

## 2025-07-31 DIAGNOSIS — Z96.652 S/P TKR (TOTAL KNEE REPLACEMENT), LEFT: Primary | ICD-10-CM

## 2025-07-31 PROCEDURE — 97110 THERAPEUTIC EXERCISES: CPT

## 2025-07-31 RX ORDER — PEN NEEDLE, DIABETIC 32GX 5/32"
NEEDLE, DISPOSABLE MISCELLANEOUS 4 TIMES DAILY
Qty: 400 EACH | Refills: 2 | Status: SHIPPED | OUTPATIENT
Start: 2025-07-31

## 2025-08-01 ENCOUNTER — TELEPHONE (OUTPATIENT)
Age: 75
End: 2025-08-01

## 2025-08-05 ENCOUNTER — OFFICE VISIT (OUTPATIENT)
Age: 75
End: 2025-08-05
Payer: MEDICARE

## 2025-08-05 DIAGNOSIS — Z96.652 S/P TKR (TOTAL KNEE REPLACEMENT), LEFT: Primary | ICD-10-CM

## 2025-08-05 PROCEDURE — 97110 THERAPEUTIC EXERCISES: CPT

## 2025-08-08 ENCOUNTER — OFFICE VISIT (OUTPATIENT)
Age: 75
End: 2025-08-08
Payer: MEDICARE

## 2025-08-08 DIAGNOSIS — Z96.652 S/P TKR (TOTAL KNEE REPLACEMENT), LEFT: Primary | ICD-10-CM

## 2025-08-08 PROCEDURE — 97110 THERAPEUTIC EXERCISES: CPT

## 2025-08-08 PROCEDURE — 97112 NEUROMUSCULAR REEDUCATION: CPT

## 2025-08-11 ENCOUNTER — OFFICE VISIT (OUTPATIENT)
Age: 75
End: 2025-08-11
Payer: MEDICARE

## 2025-08-13 ENCOUNTER — OFFICE VISIT (OUTPATIENT)
Age: 75
End: 2025-08-13
Payer: MEDICARE

## 2025-08-19 ENCOUNTER — OFFICE VISIT (OUTPATIENT)
Age: 75
End: 2025-08-19
Payer: MEDICARE

## 2025-08-19 DIAGNOSIS — Z96.652 S/P TKR (TOTAL KNEE REPLACEMENT), LEFT: Primary | ICD-10-CM

## 2025-08-19 PROCEDURE — 97112 NEUROMUSCULAR REEDUCATION: CPT

## 2025-08-19 PROCEDURE — 97110 THERAPEUTIC EXERCISES: CPT

## 2025-08-20 ENCOUNTER — OFFICE VISIT (OUTPATIENT)
Age: 75
End: 2025-08-20

## 2025-08-20 VITALS — HEIGHT: 71 IN | WEIGHT: 254 LBS | BODY MASS INDEX: 35.56 KG/M2

## 2025-08-20 DIAGNOSIS — Z96.652 AFTERCARE FOLLOWING LEFT KNEE JOINT REPLACEMENT SURGERY: Primary | ICD-10-CM

## 2025-08-20 DIAGNOSIS — Z47.1 AFTERCARE FOLLOWING LEFT KNEE JOINT REPLACEMENT SURGERY: Primary | ICD-10-CM

## 2025-08-20 PROCEDURE — 99024 POSTOP FOLLOW-UP VISIT: CPT | Performed by: PHYSICIAN ASSISTANT

## 2025-08-20 RX ORDER — MELOXICAM 15 MG/1
15 TABLET ORAL DAILY
Qty: 30 TABLET | Refills: 0 | Status: SHIPPED | OUTPATIENT
Start: 2025-08-20

## 2025-08-20 RX ORDER — TRAMADOL HYDROCHLORIDE 50 MG/1
50 TABLET ORAL 2 TIMES DAILY PRN
Qty: 30 TABLET | Refills: 0 | Status: SHIPPED | OUTPATIENT
Start: 2025-08-20

## 2025-08-22 ENCOUNTER — OFFICE VISIT (OUTPATIENT)
Age: 75
End: 2025-08-22
Payer: MEDICARE

## 2025-08-22 DIAGNOSIS — Z96.652 S/P TKR (TOTAL KNEE REPLACEMENT), LEFT: Primary | ICD-10-CM

## 2025-08-22 PROCEDURE — 97110 THERAPEUTIC EXERCISES: CPT
